# Patient Record
Sex: MALE | Race: WHITE | Employment: OTHER | URBAN - METROPOLITAN AREA
[De-identification: names, ages, dates, MRNs, and addresses within clinical notes are randomized per-mention and may not be internally consistent; named-entity substitution may affect disease eponyms.]

---

## 2018-05-03 ENCOUNTER — HOSPITAL ENCOUNTER (OUTPATIENT)
Age: 68
Discharge: HOME | End: 2018-05-05
Payer: MEDICARE

## 2018-05-03 ENCOUNTER — HOSPITAL ENCOUNTER (OUTPATIENT)
Dept: GENERAL RADIOLOGY | Age: 68
Discharge: HOME | End: 2018-05-05
Payer: MEDICARE

## 2018-05-03 DIAGNOSIS — M54.5 LOW BACK PAIN, UNSPECIFIED BACK PAIN LATERALITY, UNSPECIFIED CHRONICITY, WITH SCIATICA PRESENCE UNSPECIFIED: ICD-10-CM

## 2018-05-03 PROCEDURE — 72100 X-RAY EXAM L-S SPINE 2/3 VWS: CPT

## 2018-05-25 ENCOUNTER — HOSPITAL ENCOUNTER (OUTPATIENT)
Age: 68
Discharge: HOME | End: 2018-05-25
Payer: MEDICARE

## 2018-05-25 LAB
BASOPHILS ABSOLUTE: 0.06 E9/L (ref 0–0.2)
BASOPHILS RELATIVE PERCENT: 0.8 % (ref 0–2)
BUN BLDV-MCNC: 17 MG/DL (ref 8–23)
CHOLESTEROL, TOTAL: 237 MG/DL (ref 0–199)
CREAT SERPL-MCNC: 1.1 MG/DL (ref 0.7–1.2)
EOSINOPHILS ABSOLUTE: 0.3 E9/L (ref 0.05–0.5)
EOSINOPHILS RELATIVE PERCENT: 4 % (ref 0–6)
GFR AFRICAN AMERICAN: >60
GFR NON-AFRICAN AMERICAN: >60 ML/MIN/1.73
HCT VFR BLD CALC: 39.1 % (ref 37–54)
HDLC SERPL-MCNC: 53 MG/DL
HEMOGLOBIN: 13.1 G/DL (ref 12.5–16.5)
IMMATURE GRANULOCYTES #: 0.05 E9/L
IMMATURE GRANULOCYTES %: 0.7 % (ref 0–5)
LDL CHOLESTEROL CALCULATED: 165 MG/DL (ref 0–99)
LYMPHOCYTES ABSOLUTE: 2.27 E9/L (ref 1.5–4)
LYMPHOCYTES RELATIVE PERCENT: 30 % (ref 20–42)
MCH RBC QN AUTO: 29.1 PG (ref 26–35)
MCHC RBC AUTO-ENTMCNC: 33.5 % (ref 32–34.5)
MCV RBC AUTO: 86.9 FL (ref 80–99.9)
MONOCYTES ABSOLUTE: 0.6 E9/L (ref 0.1–0.95)
MONOCYTES RELATIVE PERCENT: 7.9 % (ref 2–12)
NEUTROPHILS ABSOLUTE: 4.29 E9/L (ref 1.8–7.3)
NEUTROPHILS RELATIVE PERCENT: 56.6 % (ref 43–80)
PDW BLD-RTO: 13.2 FL (ref 11.5–15)
PLATELET # BLD: 211 E9/L (ref 130–450)
PMV BLD AUTO: 9.6 FL (ref 7–12)
POTASSIUM SERPL-SCNC: 4.3 MMOL/L (ref 3.5–5)
RBC # BLD: 4.5 E12/L (ref 3.8–5.8)
TRIGL SERPL-MCNC: 96 MG/DL (ref 0–149)
VLDLC SERPL CALC-MCNC: 19 MG/DL
WBC # BLD: 7.6 E9/L (ref 4.5–11.5)

## 2018-05-25 PROCEDURE — 36415 COLL VENOUS BLD VENIPUNCTURE: CPT

## 2018-05-25 PROCEDURE — 84132 ASSAY OF SERUM POTASSIUM: CPT

## 2018-05-25 PROCEDURE — 85025 COMPLETE CBC W/AUTO DIFF WBC: CPT

## 2018-05-25 PROCEDURE — 84520 ASSAY OF UREA NITROGEN: CPT

## 2018-05-25 PROCEDURE — 82565 ASSAY OF CREATININE: CPT

## 2018-05-25 PROCEDURE — 80061 LIPID PANEL: CPT

## 2018-07-14 ENCOUNTER — HOSPITAL ENCOUNTER (OUTPATIENT)
Dept: MRI IMAGING | Age: 68
Discharge: HOME | End: 2018-07-16
Payer: MEDICARE

## 2018-07-14 DIAGNOSIS — M54.17 LUMBOSACRAL NEURITIS: ICD-10-CM

## 2018-07-14 PROCEDURE — 72148 MRI LUMBAR SPINE W/O DYE: CPT

## 2018-09-01 ENCOUNTER — HOSPITAL ENCOUNTER (OUTPATIENT)
Age: 68
Discharge: HOME | End: 2018-09-01
Payer: MEDICARE

## 2018-09-01 LAB
BUN BLDV-MCNC: 18 MG/DL (ref 8–23)
CREAT SERPL-MCNC: 1.2 MG/DL (ref 0.7–1.2)
GFR AFRICAN AMERICAN: >60
GFR NON-AFRICAN AMERICAN: >60 ML/MIN/1.73

## 2018-09-01 PROCEDURE — 82565 ASSAY OF CREATININE: CPT

## 2018-09-01 PROCEDURE — 36415 COLL VENOUS BLD VENIPUNCTURE: CPT

## 2018-09-01 PROCEDURE — 84520 ASSAY OF UREA NITROGEN: CPT

## 2018-12-12 ENCOUNTER — HOSPITAL ENCOUNTER (OUTPATIENT)
Dept: GENERAL RADIOLOGY | Age: 68
Discharge: HOME | End: 2018-12-14
Payer: MEDICARE

## 2018-12-12 ENCOUNTER — HOSPITAL ENCOUNTER (OUTPATIENT)
Age: 68
Discharge: HOME | End: 2018-12-14
Payer: MEDICARE

## 2018-12-12 DIAGNOSIS — M15.0 PRIMARY GENERALIZED (OSTEO)ARTHRITIS: ICD-10-CM

## 2018-12-12 PROCEDURE — 73502 X-RAY EXAM HIP UNI 2-3 VIEWS: CPT

## 2018-12-12 PROCEDURE — 73630 X-RAY EXAM OF FOOT: CPT

## 2019-03-01 ENCOUNTER — HOSPITAL ENCOUNTER (OUTPATIENT)
Dept: GENERAL RADIOLOGY | Age: 69
Discharge: HOME | End: 2019-03-03
Payer: MEDICARE

## 2019-03-01 ENCOUNTER — HOSPITAL ENCOUNTER (OUTPATIENT)
Age: 69
Discharge: HOME | End: 2019-03-03
Payer: MEDICARE

## 2019-03-01 DIAGNOSIS — R52 PAIN: ICD-10-CM

## 2019-03-01 PROCEDURE — 72100 X-RAY EXAM L-S SPINE 2/3 VWS: CPT

## 2019-03-01 PROCEDURE — 72072 X-RAY EXAM THORAC SPINE 3VWS: CPT

## 2020-05-12 ENCOUNTER — HOSPITAL ENCOUNTER (OUTPATIENT)
Age: 70
Discharge: HOME | End: 2020-05-12
Payer: MEDICARE

## 2020-05-12 LAB
ALBUMIN SERPL-MCNC: 4.1 G/DL (ref 3.5–5.2)
ALP BLD-CCNC: 77 U/L (ref 40–129)
ALT SERPL-CCNC: 19 U/L (ref 0–40)
AST SERPL-CCNC: 21 U/L (ref 0–39)
BASOPHILS ABSOLUTE: 0.06 E9/L (ref 0–0.2)
BASOPHILS RELATIVE PERCENT: 0.7 % (ref 0–2)
BILIRUB SERPL-MCNC: 0.7 MG/DL (ref 0–1.2)
BILIRUBIN DIRECT: <0.2 MG/DL (ref 0–0.3)
BILIRUBIN, INDIRECT: NORMAL MG/DL (ref 0–1)
BUN BLDV-MCNC: 16 MG/DL (ref 8–23)
CHOLESTEROL, TOTAL: 147 MG/DL (ref 0–199)
CREAT SERPL-MCNC: 1.2 MG/DL (ref 0.7–1.2)
EOSINOPHILS ABSOLUTE: 0.37 E9/L (ref 0.05–0.5)
EOSINOPHILS RELATIVE PERCENT: 4.4 % (ref 0–6)
GFR AFRICAN AMERICAN: >60
GFR NON-AFRICAN AMERICAN: 60 ML/MIN/1.73
HCT VFR BLD CALC: 39.8 % (ref 37–54)
HDLC SERPL-MCNC: 44 MG/DL
HEMOGLOBIN: 13.2 G/DL (ref 12.5–16.5)
IMMATURE GRANULOCYTES #: 0.04 E9/L
IMMATURE GRANULOCYTES %: 0.5 % (ref 0–5)
LDL CHOLESTEROL CALCULATED: 81 MG/DL (ref 0–99)
LYMPHOCYTES ABSOLUTE: 2.63 E9/L (ref 1.5–4)
LYMPHOCYTES RELATIVE PERCENT: 31.5 % (ref 20–42)
MCH RBC QN AUTO: 29.7 PG (ref 26–35)
MCHC RBC AUTO-ENTMCNC: 33.2 % (ref 32–34.5)
MCV RBC AUTO: 89.4 FL (ref 80–99.9)
MONOCYTES ABSOLUTE: 0.74 E9/L (ref 0.1–0.95)
MONOCYTES RELATIVE PERCENT: 8.9 % (ref 2–12)
NEUTROPHILS ABSOLUTE: 4.52 E9/L (ref 1.8–7.3)
NEUTROPHILS RELATIVE PERCENT: 54 % (ref 43–80)
PDW BLD-RTO: 12.6 FL (ref 11.5–15)
PLATELET # BLD: 238 E9/L (ref 130–450)
PMV BLD AUTO: 9.7 FL (ref 7–12)
POTASSIUM SERPL-SCNC: 4.9 MMOL/L (ref 3.5–5)
RBC # BLD: 4.45 E12/L (ref 3.8–5.8)
TOTAL PROTEIN: 7.2 G/DL (ref 6.4–8.3)
TRIGL SERPL-MCNC: 110 MG/DL (ref 0–149)
VLDLC SERPL CALC-MCNC: 22 MG/DL
WBC # BLD: 8.4 E9/L (ref 4.5–11.5)

## 2020-05-12 PROCEDURE — 36415 COLL VENOUS BLD VENIPUNCTURE: CPT

## 2020-05-12 PROCEDURE — 82565 ASSAY OF CREATININE: CPT

## 2020-05-12 PROCEDURE — 84520 ASSAY OF UREA NITROGEN: CPT

## 2020-05-12 PROCEDURE — 80076 HEPATIC FUNCTION PANEL: CPT

## 2020-05-12 PROCEDURE — 85025 COMPLETE CBC W/AUTO DIFF WBC: CPT

## 2020-05-12 PROCEDURE — 84132 ASSAY OF SERUM POTASSIUM: CPT

## 2020-05-12 PROCEDURE — 80061 LIPID PANEL: CPT

## 2020-06-15 ENCOUNTER — HOSPITAL ENCOUNTER (OUTPATIENT)
Age: 70
Discharge: HOME | End: 2020-06-15
Payer: MEDICARE

## 2020-06-15 LAB
BASOPHILS ABSOLUTE: 0.09 E9/L (ref 0–0.2)
BASOPHILS RELATIVE PERCENT: 1 % (ref 0–2)
BUN BLDV-MCNC: 15 MG/DL (ref 8–23)
CREAT SERPL-MCNC: 1.2 MG/DL (ref 0.7–1.2)
EOSINOPHILS ABSOLUTE: 0.45 E9/L (ref 0.05–0.5)
EOSINOPHILS RELATIVE PERCENT: 4.9 % (ref 0–6)
GFR AFRICAN AMERICAN: >60
GFR NON-AFRICAN AMERICAN: 60 ML/MIN/1.73
HCT VFR BLD CALC: 42.4 % (ref 37–54)
HEMOGLOBIN: 13.4 G/DL (ref 12.5–16.5)
IMMATURE GRANULOCYTES #: 0.07 E9/L
IMMATURE GRANULOCYTES %: 0.8 % (ref 0–5)
LYMPHOCYTES ABSOLUTE: 2.38 E9/L (ref 1.5–4)
LYMPHOCYTES RELATIVE PERCENT: 26 % (ref 20–42)
MCH RBC QN AUTO: 29.1 PG (ref 26–35)
MCHC RBC AUTO-ENTMCNC: 31.6 % (ref 32–34.5)
MCV RBC AUTO: 92.2 FL (ref 80–99.9)
MONOCYTES ABSOLUTE: 0.94 E9/L (ref 0.1–0.95)
MONOCYTES RELATIVE PERCENT: 10.3 % (ref 2–12)
NEUTROPHILS ABSOLUTE: 5.21 E9/L (ref 1.8–7.3)
NEUTROPHILS RELATIVE PERCENT: 57 % (ref 43–80)
PDW BLD-RTO: 12.7 FL (ref 11.5–15)
PLATELET # BLD: 227 E9/L (ref 130–450)
PMV BLD AUTO: 9.7 FL (ref 7–12)
POTASSIUM SERPL-SCNC: 5 MMOL/L (ref 3.5–5)
RBC # BLD: 4.6 E12/L (ref 3.8–5.8)
SEDIMENTATION RATE, ERYTHROCYTE: 5 MM/HR (ref 0–15)
WBC # BLD: 9.1 E9/L (ref 4.5–11.5)

## 2020-06-15 PROCEDURE — 82565 ASSAY OF CREATININE: CPT

## 2020-06-15 PROCEDURE — 85025 COMPLETE CBC W/AUTO DIFF WBC: CPT

## 2020-06-15 PROCEDURE — 84520 ASSAY OF UREA NITROGEN: CPT

## 2020-06-15 PROCEDURE — 85651 RBC SED RATE NONAUTOMATED: CPT

## 2020-06-15 PROCEDURE — 36415 COLL VENOUS BLD VENIPUNCTURE: CPT

## 2020-06-15 PROCEDURE — 84132 ASSAY OF SERUM POTASSIUM: CPT

## 2020-09-14 ENCOUNTER — HOSPITAL ENCOUNTER (OUTPATIENT)
Dept: NUCLEAR MEDICINE | Age: 70
Discharge: HOME | End: 2020-09-14
Payer: MEDICARE

## 2020-09-14 PROCEDURE — 3430000000 HC RX DIAGNOSTIC RADIOPHARMACEUTICAL: Performed by: RADIOLOGY

## 2020-09-14 PROCEDURE — A9552 F18 FDG: HCPCS | Performed by: RADIOLOGY

## 2020-09-14 PROCEDURE — 78815 PET IMAGE W/CT SKULL-THIGH: CPT

## 2020-09-14 RX ORDER — FLUDEOXYGLUCOSE F 18 200 MCI/ML
15 INJECTION, SOLUTION INTRAVENOUS
Status: COMPLETED | OUTPATIENT
Start: 2020-09-14 | End: 2020-09-14

## 2020-09-14 RX ADMIN — FLUDEOXYGLUCOSE F 18 15 MILLICURIE: 200 INJECTION, SOLUTION INTRAVENOUS at 14:31

## 2021-02-08 ENCOUNTER — HOSPITAL ENCOUNTER (OUTPATIENT)
Age: 71
Discharge: HOME | End: 2021-02-10
Payer: MEDICARE

## 2021-02-08 PROCEDURE — U0003 INFECTIOUS AGENT DETECTION BY NUCLEIC ACID (DNA OR RNA); SEVERE ACUTE RESPIRATORY SYNDROME CORONAVIRUS 2 (SARS-COV-2) (CORONAVIRUS DISEASE [COVID-19]), AMPLIFIED PROBE TECHNIQUE, MAKING USE OF HIGH THROUGHPUT TECHNOLOGIES AS DESCRIBED BY CMS-2020-01-R: HCPCS

## 2021-02-09 LAB — SARS-COV-2, PCR: NOT DETECTED

## 2021-05-15 ENCOUNTER — HOSPITAL ENCOUNTER (OUTPATIENT)
Dept: CT IMAGING | Age: 71
Discharge: HOME | End: 2021-05-15
Payer: MEDICARE

## 2021-05-15 DIAGNOSIS — R51.9 ACUTE NONINTRACTABLE HEADACHE, UNSPECIFIED HEADACHE TYPE: ICD-10-CM

## 2021-05-15 DIAGNOSIS — R42 VERTIGO: ICD-10-CM

## 2021-05-27 ENCOUNTER — HOSPITAL ENCOUNTER (OUTPATIENT)
Dept: CT IMAGING | Age: 71
Discharge: HOME | End: 2021-05-27
Payer: MEDICARE

## 2021-05-27 PROCEDURE — 70470 CT HEAD/BRAIN W/O & W/DYE: CPT

## 2021-05-27 PROCEDURE — 6360000004 HC RX CONTRAST MEDICATION: Performed by: RADIOLOGY

## 2021-05-27 RX ADMIN — IOPAMIDOL 75 ML: 755 INJECTION, SOLUTION INTRAVENOUS at 08:35

## 2022-02-09 ENCOUNTER — HOSPITAL ENCOUNTER (OUTPATIENT)
Age: 72
Discharge: HOME | End: 2022-02-11
Payer: MEDICARE

## 2022-02-09 ENCOUNTER — HOSPITAL ENCOUNTER (OUTPATIENT)
Dept: GENERAL RADIOLOGY | Age: 72
Discharge: HOME | End: 2022-02-11
Payer: MEDICARE

## 2022-02-09 DIAGNOSIS — R06.00 DYSPNEA AND RESPIRATORY ABNORMALITY: ICD-10-CM

## 2022-02-09 DIAGNOSIS — R06.89 DYSPNEA AND RESPIRATORY ABNORMALITY: ICD-10-CM

## 2022-02-09 PROCEDURE — 71046 X-RAY EXAM CHEST 2 VIEWS: CPT

## 2022-02-10 ENCOUNTER — HOSPITAL ENCOUNTER (OUTPATIENT)
Dept: NON INVASIVE DIAGNOSTICS | Age: 72
Discharge: HOME | End: 2022-02-10
Payer: MEDICARE

## 2022-02-10 ENCOUNTER — HOSPITAL ENCOUNTER (OUTPATIENT)
Dept: NUCLEAR MEDICINE | Age: 72
Discharge: HOME | End: 2022-02-10
Payer: MEDICARE

## 2022-02-10 ENCOUNTER — HOSPITAL ENCOUNTER (OUTPATIENT)
Age: 72
Discharge: HOME | End: 2022-02-10
Payer: MEDICARE

## 2022-02-10 DIAGNOSIS — R07.9 CHEST PAIN, UNSPECIFIED TYPE: ICD-10-CM

## 2022-02-10 LAB
EKG ATRIAL RATE: 81 BPM
EKG P AXIS: 73 DEGREES
EKG P-R INTERVAL: 176 MS
EKG Q-T INTERVAL: 436 MS
EKG QRS DURATION: 114 MS
EKG QTC CALCULATION (BAZETT): 506 MS
EKG R AXIS: -20 DEGREES
EKG T AXIS: 46 DEGREES
EKG VENTRICULAR RATE: 81 BPM
LV EF: 24 %
LV EF: 25 %
LVEF MODALITY: NORMAL
LVEF MODALITY: NORMAL

## 2022-02-10 PROCEDURE — 78452 HT MUSCLE IMAGE SPECT MULT: CPT | Performed by: INTERNAL MEDICINE

## 2022-02-10 PROCEDURE — 3430000000 HC RX DIAGNOSTIC RADIOPHARMACEUTICAL: Performed by: RADIOLOGY

## 2022-02-10 PROCEDURE — 6360000002 HC RX W HCPCS: Performed by: FAMILY MEDICINE

## 2022-02-10 PROCEDURE — 93016 CV STRESS TEST SUPVJ ONLY: CPT | Performed by: INTERNAL MEDICINE

## 2022-02-10 PROCEDURE — 93306 TTE W/DOPPLER COMPLETE: CPT

## 2022-02-10 PROCEDURE — 78452 HT MUSCLE IMAGE SPECT MULT: CPT

## 2022-02-10 PROCEDURE — 93017 CV STRESS TEST TRACING ONLY: CPT

## 2022-02-10 PROCEDURE — 3430000000 HC RX DIAGNOSTIC RADIOPHARMACEUTICAL: Performed by: FAMILY MEDICINE

## 2022-02-10 PROCEDURE — 93018 CV STRESS TEST I&R ONLY: CPT | Performed by: INTERNAL MEDICINE

## 2022-02-10 PROCEDURE — 93005 ELECTROCARDIOGRAM TRACING: CPT | Performed by: FAMILY MEDICINE

## 2022-02-10 PROCEDURE — A9500 TC99M SESTAMIBI: HCPCS | Performed by: FAMILY MEDICINE

## 2022-02-10 PROCEDURE — A9500 TC99M SESTAMIBI: HCPCS | Performed by: RADIOLOGY

## 2022-02-10 RX ADMIN — Medication 30 MILLICURIE: at 08:56

## 2022-02-10 RX ADMIN — REGADENOSON 0.4 MG: 0.08 INJECTION, SOLUTION INTRAVENOUS at 08:52

## 2022-02-10 RX ADMIN — Medication 10 MILLICURIE: at 07:25

## 2022-02-10 NOTE — PROCEDURES
Lexiscan Stress EKG Report:    Dx CP    Baseline EKG: normal sinus rhythm, nonspecific ST and T waves changes. Stress EKG: No ST-T changes. Arrhythmias: None. Symptoms: None. Summary:  Unremarkable lexiscan stress EKG. See separate report for stress perfusion results. Mark Samayoa D.O.   Cardiologist  Cardiology, [facility]

## 2022-08-09 ENCOUNTER — HOSPITAL ENCOUNTER (OUTPATIENT)
Dept: GENERAL RADIOLOGY | Age: 72
Discharge: HOME | End: 2022-08-11
Payer: MEDICARE

## 2022-08-09 ENCOUNTER — HOSPITAL ENCOUNTER (OUTPATIENT)
Age: 72
Discharge: HOME | End: 2022-08-11
Payer: MEDICARE

## 2022-08-09 DIAGNOSIS — R06.00 DYSPNEA, UNSPECIFIED TYPE: ICD-10-CM

## 2022-08-09 PROCEDURE — 71046 X-RAY EXAM CHEST 2 VIEWS: CPT

## 2023-02-24 LAB
ANION GAP IN SER/PLAS: 15 MMOL/L (ref 10–20)
CALCIUM (MG/DL) IN SER/PLAS: 9.7 MG/DL (ref 8.6–10.3)
CARBON DIOXIDE, TOTAL (MMOL/L) IN SER/PLAS: 28 MMOL/L (ref 21–32)
CHLORIDE (MMOL/L) IN SER/PLAS: 107 MMOL/L (ref 98–107)
CREATININE (MG/DL) IN SER/PLAS: 1.07 MG/DL (ref 0.5–1.3)
GFR MALE: 73 ML/MIN/1.73M2
GLUCOSE (MG/DL) IN SER/PLAS: 104 MG/DL (ref 74–99)
POTASSIUM (MMOL/L) IN SER/PLAS: 4.9 MMOL/L (ref 3.5–5.3)
SODIUM (MMOL/L) IN SER/PLAS: 145 MMOL/L (ref 136–145)
UREA NITROGEN (MG/DL) IN SER/PLAS: 15 MG/DL (ref 6–23)

## 2023-03-31 LAB
ANION GAP IN SER/PLAS: 11 MMOL/L (ref 10–20)
CALCIUM (MG/DL) IN SER/PLAS: 9.3 MG/DL (ref 8.6–10.3)
CARBON DIOXIDE, TOTAL (MMOL/L) IN SER/PLAS: 27 MMOL/L (ref 21–32)
CHLORIDE (MMOL/L) IN SER/PLAS: 105 MMOL/L (ref 98–107)
CREATININE (MG/DL) IN SER/PLAS: 1.06 MG/DL (ref 0.5–1.3)
GFR MALE: 74 ML/MIN/1.73M2
GLUCOSE (MG/DL) IN SER/PLAS: 101 MG/DL (ref 74–99)
POTASSIUM (MMOL/L) IN SER/PLAS: 4 MMOL/L (ref 3.5–5.3)
SODIUM (MMOL/L) IN SER/PLAS: 139 MMOL/L (ref 136–145)
UREA NITROGEN (MG/DL) IN SER/PLAS: 17 MG/DL (ref 6–23)

## 2023-05-09 LAB
ANION GAP IN SER/PLAS: 11 MMOL/L (ref 10–20)
CALCIUM (MG/DL) IN SER/PLAS: 9.6 MG/DL (ref 8.6–10.3)
CARBON DIOXIDE, TOTAL (MMOL/L) IN SER/PLAS: 30 MMOL/L (ref 21–32)
CHLORIDE (MMOL/L) IN SER/PLAS: 102 MMOL/L (ref 98–107)
CREATININE (MG/DL) IN SER/PLAS: 1.07 MG/DL (ref 0.5–1.3)
GFR MALE: 73 ML/MIN/1.73M2
GLUCOSE (MG/DL) IN SER/PLAS: 109 MG/DL (ref 74–99)
POTASSIUM (MMOL/L) IN SER/PLAS: 4 MMOL/L (ref 3.5–5.3)
SODIUM (MMOL/L) IN SER/PLAS: 139 MMOL/L (ref 136–145)
UREA NITROGEN (MG/DL) IN SER/PLAS: 18 MG/DL (ref 6–23)

## 2023-07-24 RX ORDER — CARVEDILOL 12.5 MG/1
12.5 TABLET ORAL 2 TIMES DAILY WITH MEALS
COMMUNITY

## 2023-07-24 RX ORDER — OMEPRAZOLE 20 MG/1
20 CAPSULE, DELAYED RELEASE ORAL DAILY
COMMUNITY

## 2023-07-24 RX ORDER — HYDROCHLOROTHIAZIDE 25 MG/1
25 TABLET ORAL DAILY
COMMUNITY

## 2023-07-24 RX ORDER — EPLERENONE 50 MG/1
50 TABLET, FILM COATED ORAL DAILY
COMMUNITY

## 2023-07-24 RX ORDER — HYDRALAZINE HYDROCHLORIDE 50 MG/1
50 TABLET, FILM COATED ORAL 2 TIMES DAILY
COMMUNITY

## 2023-07-24 NOTE — PROGRESS NOTES
[address] [facility] PRE-ADMISSION TESTING INSTRUCTIONS    The Preadmission Testing patient is instructed accordingly using the following criteria (check applicable):    ARRIVAL INSTRUCTIONS:  [x] Parking the day of Surgery is located in the Main Entrance lot. Upon entering the door, make an immediate right to the surgery reception desk    [x] Bring photo ID and insurance card    [] Bring in a copy of Living will or Durable Power of  papers. [x] Please be sure to arrange for responsible adult to provide transportation to and from the hospital     Please arrange for responsible adult to be w[x]ith you for the 24 hour period post procedure due to having anesthesia    [x] If you awake am of surgery not feeling well or have temperature >100 please call [phone]    GENERAL INSTRUCTIONS:    [x] Nothing by mouth after midnight, including gum, candy, mints or water    [x] You may brush your teeth, but do not swallow any water    [x] Take medications as instructed with 1-2 oz of water    [x] Stop herbal supplements and vitamins 5 days prior to procedure    [] Follow preop dosing of blood thinners per physician instructions    [] Take 1/2 dose of evening insulin, but no insulin after midnight    [] No oral diabetic medications after midnight    [] If diabetic and have low blood sugar or feel symptomatic, take 1-2oz apple juice only    [] Bring inhalers day of surgery    [] Bring C-PAP/ Bi-Pap day of surgery    [] Bring urine specimen day of surgery    [x] Shower or bath with soap, lather and rinse well, AM of Surgery, no lotion, powders or creams to surgical site    [] Follow bowel prep as instructed per surgeon    [x] No tobacco products within 24 hours of surgery     [x] No alcohol or illegal drug use within 24 hours of surgery.     [x] Jewelry, body piercing's, eyeglasses, contact lenses and dentures are not permitted into surgery (bring cases)      [] Please do not wear any nail polish,

## 2023-07-25 ENCOUNTER — ANESTHESIA EVENT (OUTPATIENT)
Dept: OPERATING ROOM | Age: 73
End: 2023-07-25
Payer: MEDICARE

## 2023-07-26 ENCOUNTER — HOSPITAL ENCOUNTER (OUTPATIENT)
Age: 73
Setting detail: OUTPATIENT SURGERY
Discharge: HOME | End: 2023-07-26
Attending: OPHTHALMOLOGY | Admitting: OPHTHALMOLOGY
Payer: MEDICARE

## 2023-07-26 ENCOUNTER — ANESTHESIA (OUTPATIENT)
Dept: OPERATING ROOM | Age: 73
End: 2023-07-26
Payer: MEDICARE

## 2023-07-26 VITALS
OXYGEN SATURATION: 94 % | SYSTOLIC BLOOD PRESSURE: 190 MMHG | WEIGHT: 229 LBS | RESPIRATION RATE: 16 BRPM | TEMPERATURE: 97.7 F | HEART RATE: 62 BPM | HEIGHT: 69 IN | DIASTOLIC BLOOD PRESSURE: 84 MMHG | BODY MASS INDEX: 33.92 KG/M2

## 2023-07-26 LAB — METER GLUCOSE: 94 MG/DL (ref 74–99)

## 2023-07-26 PROCEDURE — 2709999900 HC NON-CHARGEABLE SUPPLY: Performed by: OPHTHALMOLOGY

## 2023-07-26 PROCEDURE — 2580000003 HC RX 258: Performed by: OPHTHALMOLOGY

## 2023-07-26 PROCEDURE — V2632 POST CHMBR INTRAOCULAR LENS: HCPCS | Performed by: OPHTHALMOLOGY

## 2023-07-26 PROCEDURE — 7100000010 HC PHASE II RECOVERY - FIRST 15 MIN: Performed by: OPHTHALMOLOGY

## 2023-07-26 PROCEDURE — 6370000000 HC RX 637 (ALT 250 FOR IP)

## 2023-07-26 PROCEDURE — 7100000011 HC PHASE II RECOVERY - ADDTL 15 MIN: Performed by: OPHTHALMOLOGY

## 2023-07-26 PROCEDURE — 6370000000 HC RX 637 (ALT 250 FOR IP): Performed by: OPHTHALMOLOGY

## 2023-07-26 PROCEDURE — 2500000003 HC RX 250 WO HCPCS: Performed by: OPHTHALMOLOGY

## 2023-07-26 PROCEDURE — 6360000002 HC RX W HCPCS: Performed by: NURSE ANESTHETIST, CERTIFIED REGISTERED

## 2023-07-26 PROCEDURE — 6360000002 HC RX W HCPCS: Performed by: OPHTHALMOLOGY

## 2023-07-26 PROCEDURE — 3600000002 HC SURGERY LEVEL 2 BASE: Performed by: OPHTHALMOLOGY

## 2023-07-26 PROCEDURE — 3700000000 HC ANESTHESIA ATTENDED CARE: Performed by: OPHTHALMOLOGY

## 2023-07-26 PROCEDURE — 2500000003 HC RX 250 WO HCPCS: Performed by: NURSE ANESTHETIST, CERTIFIED REGISTERED

## 2023-07-26 PROCEDURE — 82947 ASSAY GLUCOSE BLOOD QUANT: CPT

## 2023-07-26 PROCEDURE — 3600000012 HC SURGERY LEVEL 2 ADDTL 15MIN: Performed by: OPHTHALMOLOGY

## 2023-07-26 PROCEDURE — 3700000001 HC ADD 15 MINUTES (ANESTHESIA): Performed by: OPHTHALMOLOGY

## 2023-07-26 DEVICE — ACRYSOF(R) IQ ASPHERIC NATURAL IOL, SINGLE-PIECE ACRYLIC FOLDABLE PCL, UV WITH BLUE LIGHTFILTER, 13.0MM LENGTH, 6.0MM ANTERIORASYMMETRIC BICONVEX OPTIC, PLANAR HAPTICS.
Type: IMPLANTABLE DEVICE | Site: EYE | Status: FUNCTIONAL
Brand: ACRYSOF®

## 2023-07-26 RX ORDER — BALANCED SALT SOLUTION ENRICHED WITH BICARBONATE, DEXTROSE, AND GLUTATHIONE
KIT INTRAOCULAR PRN
Status: DISCONTINUED | OUTPATIENT
Start: 2023-07-26 | End: 2023-07-26 | Stop reason: ALTCHOICE

## 2023-07-26 RX ORDER — TROPICAMIDE 10 MG/ML
1 SOLUTION/ DROPS OPHTHALMIC SEE ADMIN INSTRUCTIONS
Status: COMPLETED | OUTPATIENT
Start: 2023-07-26 | End: 2023-07-26

## 2023-07-26 RX ORDER — PHENYLEPHRINE HCL 2.5 %
1 DROPS OPHTHALMIC (EYE) SEE ADMIN INSTRUCTIONS
Status: COMPLETED | OUTPATIENT
Start: 2023-07-26 | End: 2023-07-26

## 2023-07-26 RX ORDER — KETOROLAC TROMETHAMINE 5 MG/ML
SOLUTION OPHTHALMIC PRN
Status: DISCONTINUED | OUTPATIENT
Start: 2023-07-26 | End: 2023-07-26 | Stop reason: ALTCHOICE

## 2023-07-26 RX ORDER — CIPROFLOXACIN HYDROCHLORIDE 3.5 MG/ML
SOLUTION/ DROPS TOPICAL PRN
Status: DISCONTINUED | OUTPATIENT
Start: 2023-07-26 | End: 2023-07-26 | Stop reason: ALTCHOICE

## 2023-07-26 RX ORDER — TOBRAMYCIN 3 MG/ML
1 SOLUTION/ DROPS OPHTHALMIC
Status: DISCONTINUED | OUTPATIENT
Start: 2023-07-26 | End: 2023-07-26

## 2023-07-26 RX ORDER — TETRACAINE HYDROCHLORIDE 5 MG/ML
1 SOLUTION OPHTHALMIC SEE ADMIN INSTRUCTIONS
Status: COMPLETED | OUTPATIENT
Start: 2023-07-26 | End: 2023-07-26

## 2023-07-26 RX ORDER — PROPOFOL 10 MG/ML
INJECTION, EMULSION INTRAVENOUS PRN
Status: DISCONTINUED | OUTPATIENT
Start: 2023-07-26 | End: 2023-07-26 | Stop reason: SDUPTHER

## 2023-07-26 RX ORDER — GLYCOPYRROLATE 0.2 MG/ML
INJECTION INTRAMUSCULAR; INTRAVENOUS PRN
Status: DISCONTINUED | OUTPATIENT
Start: 2023-07-26 | End: 2023-07-26 | Stop reason: SDUPTHER

## 2023-07-26 RX ORDER — PREDNISOLONE ACETATE 10 MG/ML
SUSPENSION/ DROPS OPHTHALMIC PRN
Status: DISCONTINUED | OUTPATIENT
Start: 2023-07-26 | End: 2023-07-26 | Stop reason: ALTCHOICE

## 2023-07-26 RX ORDER — SODIUM CHLORIDE 9 MG/ML
INJECTION, SOLUTION INTRAVENOUS CONTINUOUS
Status: DISCONTINUED | OUTPATIENT
Start: 2023-07-26 | End: 2023-07-26 | Stop reason: HOSPADM

## 2023-07-26 RX ADMIN — PHENYLEPHRINE HYDROCHLORIDE 1 DROP: 25 SOLUTION/ DROPS OPHTHALMIC at 13:36

## 2023-07-26 RX ADMIN — GLYCOPYRROLATE 0.3 MG: 0.2 INJECTION INTRAMUSCULAR; INTRAVENOUS at 14:32

## 2023-07-26 RX ADMIN — PHENYLEPHRINE HYDROCHLORIDE 1 DROP: 25 SOLUTION/ DROPS OPHTHALMIC at 13:46

## 2023-07-26 RX ADMIN — Medication 1 DROP: at 13:37

## 2023-07-26 RX ADMIN — TETRACAINE HYDROCHLORIDE 1 DROP: 5 SOLUTION OPHTHALMIC at 14:00

## 2023-07-26 RX ADMIN — PROPOFOL 100 MG: 10 INJECTION, EMULSION INTRAVENOUS at 14:24

## 2023-07-26 RX ADMIN — Medication 1 DROP: at 14:00

## 2023-07-26 RX ADMIN — TETRACAINE HYDROCHLORIDE 1 DROP: 5 SOLUTION OPHTHALMIC at 13:36

## 2023-07-26 RX ADMIN — PHENYLEPHRINE HYDROCHLORIDE 1 DROP: 25 SOLUTION/ DROPS OPHTHALMIC at 14:00

## 2023-07-26 RX ADMIN — Medication 1 DROP: at 13:46

## 2023-07-26 RX ADMIN — TETRACAINE HYDROCHLORIDE 1 DROP: 5 SOLUTION OPHTHALMIC at 13:46

## 2023-07-26 ASSESSMENT — PAIN SCALES - GENERAL
PAINLEVEL_OUTOF10: 0
PAINLEVEL_OUTOF10: 0

## 2023-07-26 NOTE — DISCHARGE INSTR - MEDS
Keep patch in place tonight. OK to remove in AM. Do not throw away plastic shield, tape this over right eye without any gauze for one week after the surgery. Use OMNI \"All in One\" drops, one drop 3X daily for one week, then 2X daily for an additional 3 weeks. Place Tobradex ointment in right eye at bedtime for 3 weeks or until gone. Bring all drops to appointment in [place] office tomorrow.     Visit is scheduled at 07:45 tomorrow AM in  [place] with Dr. Brea Tubbs

## 2023-07-26 NOTE — BRIEF OP NOTE
Brief Postoperative Note      Patient: Jaxson Records  YOB: 1950  MRN: [id number]    Date of Procedure: 7/26/2023    Pre-Op Diagnosis Codes:     * Nuclear sclerotic cataract of right eye [H25.11]    Post-Op Diagnosis: Same       Procedure(s):  RIGHT EYE CATARACT EXTRACTION IOL IMPLANT    Surgeon(s):  Blossom Simpson MD    Assistant:  * No surgical staff found *    Anesthesia: Monitor Anesthesia Care    Estimated Blood Loss (mL): Minimal    Complications: None    Specimens:   * No specimens in log *    Implants:  Implant Name Type Inv.  Item Serial No.  Lot No. LRB No. Used Action   LENS INTOCU 6.0 TO 30.0 DIOPT 118.7 A CONSTANT 0DEG ANG - [id number]  LENS INTOCU 6.0 TO 30.0 DIOPT 118.7 A CONSTANT 0DEG ANG [id number]-  Right 1 Implanted         Drains: * No LDAs found *    Findings: none      Electronically signed by Blossom Simpson MD on 7/26/2023 at 3:13 PM

## 2023-07-27 NOTE — ANESTHESIA POSTPROCEDURE EVALUATION
Department of Anesthesiology  Postprocedure Note    Patient: Bernadette Almeida  MRN: [id number]  [address]: 1950  Date of evaluation: 7/26/2023      Procedure Summary     Date: 07/26/23 Room / Location: [facility] OR 03 / [facility]    Anesthesia Start: 8869 Anesthesia Stop: 1504    Procedure: RIGHT EYE CATARACT EXTRACTION IOL IMPLANT (Right: Eye) Diagnosis:       Nuclear sclerotic cataract of right eye      (Nuclear sclerotic cataract of right eye [H25.11])    Surgeons: Parish Babb MD Responsible Provider: Chandrika Adam MD    Anesthesia Type: MAC ASA Status: 4          Anesthesia Type: MAC    Naomi Phase I: Naomi Score: 10    Naomi Phase II: Naomi Score: 10      Anesthesia Post Evaluation    Patient location during evaluation: PACU  Patient participation: complete - patient participated  Level of consciousness: awake and alert  Pain score: 3  Airway patency: patent  Nausea & Vomiting: no vomiting and no nausea  Complications: no  Cardiovascular status: hemodynamically stable  Respiratory status: spontaneous ventilation, nonlabored ventilation and acceptable  Hydration status: stable

## 2023-07-27 NOTE — OP NOTE
was.    Remainder of the cortical material was removed with the I/A handpiece  and the undersurface of the lens capsule was vacuumed of lens epithelial  cells and the posterior capsule was polished with a Alex scratcher. The capsular bag was inflated and an Gorge AcrySof lens, [id number], was  then injected into the capsular bag after inflating with the Provisc. The incisions were hydrated and the I/A handpiece was used to remove the  remainder of the viscoelastic from behind the lens as well as the  anterior chamber. It was removed and further hydration of the incisions  was then ensued to ensure that they were watertight. The patient received intracameral cefuroxime and lid speculum and drapes  were removed and the patient received Pred Forte, ketorolac drops as  well as TobraDex ointment. A pressure patch and Strange shield were taped  to the eye and the patient was then discharged to the recovery room from  the operating room in excellent condition having tolerated the procedure  well.         Dora Ackerman MD    D: 07/26/2023 15:45:12       T: 07/27/2023 2:44:03     [email or web address]  Job#: [id number]     Doc#: [id number]    CC:

## 2023-08-21 RX ORDER — ASPIRIN 81 MG/1
81 TABLET ORAL DAILY
COMMUNITY

## 2023-08-21 NOTE — PROGRESS NOTES
[address] [employer] PRE-ADMISSION TESTING INSTRUCTIONS    The Preadmission Testing patient is instructed accordingly using the following criteria (check applicable):    ARRIVAL INSTRUCTIONS:  [x] Parking the day of Surgery is located in the Main Entrance lot. Upon entering the door, make an immediate right to the surgery reception desk    [x] Bring photo ID and insurance card    [x] Bring in a copy of Living will or Durable Power of  papers. [x] Please be sure to arrange for responsible adult to provide transportation to and from the hospital    [x] Please arrange for responsible adult to be with you for the 24 hour period post procedure due to having anesthesia    [x] If you awake am of surgery not feeling well or have temperature >100 please call [phone]    GENERAL INSTRUCTIONS:    [x] Nothing by mouth after midnight, including gum, candy, mints or water    [x] You may brush your teeth, but do not swallow any water    [x] Take medications as instructed with 1-2 oz of water    [x] Stop herbal supplements and vitamins 5 days prior to procedure    [x] Follow preop dosing of blood thinners per physician instructions    [] Take 1/2 dose of evening insulin, but no insulin after midnight    [] No oral diabetic medications after midnight    [] If diabetic and have low blood sugar or feel symptomatic, take 1-2oz apple juice only    [] Bring inhalers day of surgery    [] Bring C-PAP/ Bi-Pap day of surgery    [] Bring urine specimen day of surgery    [x] Shower or bath with soap, lather and rinse well, AM of Surgery, no lotion, powders or creams to surgical site    [] Follow bowel prep as instructed per surgeon    [x] No tobacco products within 24 hours of surgery     [x] No alcohol or illegal drug use within 24 hours of surgery.     [x] Jewelry, body piercing's, eyeglasses, contact lenses and dentures are not permitted into surgery (bring cases)      [x] Please do not wear any nail polish,

## 2023-08-23 ENCOUNTER — HOSPITAL ENCOUNTER (OUTPATIENT)
Age: 73
Setting detail: OUTPATIENT SURGERY
Discharge: HOME | End: 2023-08-23
Attending: OPHTHALMOLOGY | Admitting: OPHTHALMOLOGY
Payer: MEDICARE

## 2023-08-23 ENCOUNTER — ANESTHESIA EVENT (OUTPATIENT)
Dept: OPERATING ROOM | Age: 73
End: 2023-08-23
Payer: MEDICARE

## 2023-08-23 ENCOUNTER — ANESTHESIA (OUTPATIENT)
Dept: OPERATING ROOM | Age: 73
End: 2023-08-23
Payer: MEDICARE

## 2023-08-23 VITALS
BODY MASS INDEX: 32.29 KG/M2 | DIASTOLIC BLOOD PRESSURE: 76 MMHG | HEIGHT: 69 IN | SYSTOLIC BLOOD PRESSURE: 145 MMHG | HEART RATE: 60 BPM | TEMPERATURE: 97.8 F | OXYGEN SATURATION: 98 % | RESPIRATION RATE: 16 BRPM | WEIGHT: 218 LBS

## 2023-08-23 PROCEDURE — 7100000010 HC PHASE II RECOVERY - FIRST 15 MIN: Performed by: OPHTHALMOLOGY

## 2023-08-23 PROCEDURE — 3600000012 HC SURGERY LEVEL 2 ADDTL 15MIN: Performed by: OPHTHALMOLOGY

## 2023-08-23 PROCEDURE — 6370000000 HC RX 637 (ALT 250 FOR IP): Performed by: OPHTHALMOLOGY

## 2023-08-23 PROCEDURE — V2632 POST CHMBR INTRAOCULAR LENS: HCPCS | Performed by: OPHTHALMOLOGY

## 2023-08-23 PROCEDURE — 3700000000 HC ANESTHESIA ATTENDED CARE: Performed by: OPHTHALMOLOGY

## 2023-08-23 PROCEDURE — 6360000002 HC RX W HCPCS: Performed by: OPHTHALMOLOGY

## 2023-08-23 PROCEDURE — 2500000003 HC RX 250 WO HCPCS

## 2023-08-23 PROCEDURE — 3700000001 HC ADD 15 MINUTES (ANESTHESIA): Performed by: OPHTHALMOLOGY

## 2023-08-23 PROCEDURE — 3600000002 HC SURGERY LEVEL 2 BASE: Performed by: OPHTHALMOLOGY

## 2023-08-23 PROCEDURE — 7100000011 HC PHASE II RECOVERY - ADDTL 15 MIN: Performed by: OPHTHALMOLOGY

## 2023-08-23 PROCEDURE — 2580000003 HC RX 258

## 2023-08-23 PROCEDURE — 2709999900 HC NON-CHARGEABLE SUPPLY: Performed by: OPHTHALMOLOGY

## 2023-08-23 PROCEDURE — 6360000002 HC RX W HCPCS

## 2023-08-23 PROCEDURE — 2500000003 HC RX 250 WO HCPCS: Performed by: OPHTHALMOLOGY

## 2023-08-23 PROCEDURE — 2580000003 HC RX 258: Performed by: OPHTHALMOLOGY

## 2023-08-23 DEVICE — ACRYSOF(R) IQ ASPHERIC NATURAL IOL, SINGLE-PIECE ACRYLIC FOLDABLE PCL, UV WITH BLUE LIGHTFILTER, 13.0MM LENGTH, 6.0MM ANTERIORASYMMETRIC BICONVEX OPTIC, PLANAR HAPTICS.
Type: IMPLANTABLE DEVICE | Site: EYE | Status: FUNCTIONAL
Brand: ACRYSOF®

## 2023-08-23 RX ORDER — ETOMIDATE 2 MG/ML
INJECTION INTRAVENOUS PRN
Status: DISCONTINUED | OUTPATIENT
Start: 2023-08-23 | End: 2023-08-23 | Stop reason: SDUPTHER

## 2023-08-23 RX ORDER — BALANCED SALT SOLUTION ENRICHED WITH BICARBONATE, DEXTROSE, AND GLUTATHIONE
KIT INTRAOCULAR PRN
Status: DISCONTINUED | OUTPATIENT
Start: 2023-08-23 | End: 2023-08-23 | Stop reason: ALTCHOICE

## 2023-08-23 RX ORDER — KETOROLAC TROMETHAMINE 5 MG/ML
SOLUTION OPHTHALMIC PRN
Status: DISCONTINUED | OUTPATIENT
Start: 2023-08-23 | End: 2023-08-23 | Stop reason: ALTCHOICE

## 2023-08-23 RX ORDER — CIPROFLOXACIN HYDROCHLORIDE 3.5 MG/ML
SOLUTION/ DROPS TOPICAL PRN
Status: DISCONTINUED | OUTPATIENT
Start: 2023-08-23 | End: 2023-08-23 | Stop reason: ALTCHOICE

## 2023-08-23 RX ORDER — PHENYLEPHRINE HYDROCHLORIDE 25 MG/ML
1 SOLUTION/ DROPS OPHTHALMIC SEE ADMIN INSTRUCTIONS
Status: COMPLETED | OUTPATIENT
Start: 2023-08-23 | End: 2023-08-23

## 2023-08-23 RX ORDER — SODIUM CHLORIDE 9 MG/ML
INJECTION, SOLUTION INTRAVENOUS CONTINUOUS
Status: DISCONTINUED | OUTPATIENT
Start: 2023-08-23 | End: 2023-08-23 | Stop reason: HOSPADM

## 2023-08-23 RX ORDER — TOBRAMYCIN 3 MG/ML
1 SOLUTION/ DROPS OPHTHALMIC
Status: COMPLETED | OUTPATIENT
Start: 2023-08-23 | End: 2023-08-23

## 2023-08-23 RX ORDER — PREDNISOLONE ACETATE 10 MG/ML
SUSPENSION/ DROPS OPHTHALMIC PRN
Status: DISCONTINUED | OUTPATIENT
Start: 2023-08-23 | End: 2023-08-23 | Stop reason: ALTCHOICE

## 2023-08-23 RX ORDER — TETRACAINE HYDROCHLORIDE 5 MG/ML
1 SOLUTION OPHTHALMIC SEE ADMIN INSTRUCTIONS
Status: COMPLETED | OUTPATIENT
Start: 2023-08-23 | End: 2023-08-23

## 2023-08-23 RX ORDER — SODIUM CHLORIDE 9 MG/ML
INJECTION, SOLUTION INTRAVENOUS CONTINUOUS PRN
Status: DISCONTINUED | OUTPATIENT
Start: 2023-08-23 | End: 2023-08-23 | Stop reason: SDUPTHER

## 2023-08-23 RX ORDER — MIDAZOLAM HYDROCHLORIDE 1 MG/ML
INJECTION INTRAMUSCULAR; INTRAVENOUS PRN
Status: DISCONTINUED | OUTPATIENT
Start: 2023-08-23 | End: 2023-08-23 | Stop reason: SDUPTHER

## 2023-08-23 RX ORDER — TROPICAMIDE 10 MG/ML
1 SOLUTION/ DROPS OPHTHALMIC SEE ADMIN INSTRUCTIONS
Status: COMPLETED | OUTPATIENT
Start: 2023-08-23 | End: 2023-08-23

## 2023-08-23 RX ADMIN — ETOMIDATE 8 MG: 20 INJECTION, SOLUTION INTRAVENOUS at 13:04

## 2023-08-23 RX ADMIN — Medication 1 DROP: at 12:43

## 2023-08-23 RX ADMIN — TETRACAINE HYDROCHLORIDE 1 DROP: 5 SOLUTION OPHTHALMIC at 12:32

## 2023-08-23 RX ADMIN — Medication 1 DROP: at 12:32

## 2023-08-23 RX ADMIN — PHENYLEPHRINE HYDROCHLORIDE 1 DROP: 25 SOLUTION/ DROPS OPHTHALMIC at 12:32

## 2023-08-23 RX ADMIN — SODIUM CHLORIDE: 9 INJECTION, SOLUTION INTRAVENOUS at 12:56

## 2023-08-23 RX ADMIN — PHENYLEPHRINE HYDROCHLORIDE 1 DROP: 25 SOLUTION/ DROPS OPHTHALMIC at 12:26

## 2023-08-23 RX ADMIN — PHENYLEPHRINE HYDROCHLORIDE 1 DROP: 25 SOLUTION/ DROPS OPHTHALMIC at 12:43

## 2023-08-23 RX ADMIN — MIDAZOLAM 2 MG: 1 INJECTION INTRAMUSCULAR; INTRAVENOUS at 12:56

## 2023-08-23 RX ADMIN — Medication 1 DROP: at 12:26

## 2023-08-23 RX ADMIN — TETRACAINE HYDROCHLORIDE 1 DROP: 5 SOLUTION OPHTHALMIC at 12:43

## 2023-08-23 RX ADMIN — TETRACAINE HYDROCHLORIDE 1 DROP: 5 SOLUTION OPHTHALMIC at 12:26

## 2023-08-23 ASSESSMENT — LIFESTYLE VARIABLES: SMOKING_STATUS: 0

## 2023-08-23 NOTE — DISCHARGE INSTRUCTIONS
REMOVE PATCH AND SHIELD IN am TOMORROW. NO HEAVY LIFTING/BENDING.     Eye drops to left eyeas follows:    Prednisolone Acetate `1% one drop every 2 hours while awake  Ketorolac one drop 3X daily  Ciprofloxacin one drop 3X daily    Tobradex ointment to left eye at bedtime

## 2023-08-23 NOTE — BRIEF OP NOTE
Brief Postoperative Note      Patient: Macario Pollard  YOB: 1950  MRN: [id number]    Date of Procedure: 8/23/2023    Pre-Op Diagnosis Codes:     * Combined forms of age-related cataract of left eye [H25.812]    Post-Op Diagnosis: Same       Procedure(s):  LEFT EYE CATARACT EXTRACTION IOL IMPLANT    Surgeon(s):  Jet Glass MD    Assistant:  * No surgical staff found *    Anesthesia: Monitor Anesthesia Care    Estimated Blood Loss (mL): Minimal    Complications: None    Specimens:   * No specimens in log *    Implants:  Implant Name Type Inv.  Item Serial No.  Lot No. LRB No. Used Action   LENS INTOCU 6.0 TO 30.0 DIOPT 118.7 A CONSTANT 0DEG ANG - [id number]  LENS INTOCU 6.0 TO 30.0 DIOPT 118.7 A CONSTANT 0DEG ANG [id number]  Left 1 Implanted         Drains: * No LDAs found *    Findings: NONE      Electronically signed by Jet Glass MD on 8/23/2023 at 1:44 PM

## 2023-08-24 NOTE — OP NOTE
[address]                  [address]                                OPERATIVE REPORT    PATIENT NAME: Fabrice Pino                    :        1950  MED REC NO:   [id number]                            ROOM:  ACCOUNT NO:   [de-identified]                           ADMIT DATE: 2023  PROVIDER:     Hilda Feldman MD    DATE OF PROCEDURE:  2023    PREOPERATIVE DIAGNOSIS:  Dense nuclear sclerotic cataract, left eye. POSTOPERATIVE DIAGNOSIS:  Dense nuclear sclerotic cataract, left eye. PROCEDURE PERFORMED:  Phacoemulsification of lens, posterior chamber  intraocular lens implant. ANESTHESIA:  Local MAC. COMPLICATIONS:  None. SURGEON:  Hilda Feldman MD.    INDICATIONS FOR PROCEDURE:  The patient is a pleasant 71-year-old  gentleman noting decline in vision in his left eye. With risks,  benefits, and alternatives discussed with him, he wished to proceed with  the above procedure in order to improve upon his vision. DESCRIPTION OF OPERATION:  The patient was brought to the operating  room, placed in the supine position on the operating table. He was  administered IV sedation. While under it, he was given a 3 mL of  retrobulbar nerve block to the left eye consisting of a 50:50 mixture of  0.75% Marcaine and 2% lidocaine. He was prepped and draped in sterile  fashion for intraocular surgery. Attention was directed to the left  eye, where a lid speculum was placed and operating microscope brought  into view. A paracentesis was made at the 6 o'clock position in the eye and the eye  entered temporally with a 2.4-mm keratome. Lidocaine was injected in  the anterior chamber and a mixture of epinephrine and lidocaine was  injected in the anterior chamber and viscoelastic was injected in the  anterior chamber as well.   A 5-mm capsulorrhexis was fashioned in the  usual manner and hydrodissection of the lens took place

## 2023-09-14 LAB
ANION GAP IN SER/PLAS: 13 MMOL/L (ref 10–20)
CALCIUM (MG/DL) IN SER/PLAS: 9.6 MG/DL (ref 8.6–10.3)
CARBON DIOXIDE, TOTAL (MMOL/L) IN SER/PLAS: 31 MMOL/L (ref 21–32)
CHLORIDE (MMOL/L) IN SER/PLAS: 102 MMOL/L (ref 98–107)
CREATININE (MG/DL) IN SER/PLAS: 1.07 MG/DL (ref 0.5–1.3)
GFR MALE: 73 ML/MIN/1.73M2
GLUCOSE (MG/DL) IN SER/PLAS: 106 MG/DL (ref 74–99)
POTASSIUM (MMOL/L) IN SER/PLAS: 3.8 MMOL/L (ref 3.5–5.3)
SODIUM (MMOL/L) IN SER/PLAS: 142 MMOL/L (ref 136–145)
UREA NITROGEN (MG/DL) IN SER/PLAS: 17 MG/DL (ref 6–23)

## 2023-10-13 ENCOUNTER — APPOINTMENT (OUTPATIENT)
Dept: CARDIOLOGY | Facility: HOSPITAL | Age: 73
End: 2023-10-13
Payer: MEDICARE

## 2023-10-16 ENCOUNTER — APPOINTMENT (OUTPATIENT)
Dept: CARDIOLOGY | Facility: CLINIC | Age: 73
End: 2023-10-16
Payer: MEDICARE

## 2023-11-21 ENCOUNTER — HOSPITAL ENCOUNTER (OUTPATIENT)
Dept: CARDIOLOGY | Facility: HOSPITAL | Age: 73
Discharge: HOME | End: 2023-11-21
Payer: MEDICARE

## 2023-11-21 ENCOUNTER — OFFICE VISIT (OUTPATIENT)
Dept: CARDIOLOGY | Facility: HOSPITAL | Age: 73
End: 2023-11-21
Payer: MEDICARE

## 2023-11-21 VITALS
DIASTOLIC BLOOD PRESSURE: 87 MMHG | WEIGHT: 231.2 LBS | HEART RATE: 60 BPM | OXYGEN SATURATION: 98 % | SYSTOLIC BLOOD PRESSURE: 148 MMHG | RESPIRATION RATE: 20 BRPM | BODY MASS INDEX: 33.17 KG/M2

## 2023-11-21 DIAGNOSIS — I50.22 CHRONIC SYSTOLIC HEART FAILURE (MULTI): ICD-10-CM

## 2023-11-21 DIAGNOSIS — I42.8 NON-ISCHEMIC CARDIOMYOPATHY (MULTI): ICD-10-CM

## 2023-11-21 DIAGNOSIS — I50.22 CHRONIC SYSTOLIC HEART FAILURE (MULTI): Primary | ICD-10-CM

## 2023-11-21 DIAGNOSIS — I10 HYPERTENSION, UNSPECIFIED TYPE: ICD-10-CM

## 2023-11-21 LAB
ATRIAL RATE: 0 BPM
P AXIS: 53 DEGREES
PR INTERVAL: 223 MS
Q ONSET: 251 MS
QRS COUNT: 9 BEATS
QRS DURATION: 119 MS
QT INTERVAL: 422 MS
QTC CALCULATION(BAZETT): 411 MS
QTC FREDERICIA: 414 MS
R AXIS: -8 DEGREES
T AXIS: 126 DEGREES
T OFFSET: 462 MS
VENTRICULAR RATE: 57 BPM

## 2023-11-21 PROCEDURE — 93005 ELECTROCARDIOGRAM TRACING: CPT

## 2023-11-21 PROCEDURE — 3079F DIAST BP 80-89 MM HG: CPT | Performed by: NURSE PRACTITIONER

## 2023-11-21 PROCEDURE — 93010 ELECTROCARDIOGRAM REPORT: CPT | Performed by: INTERNAL MEDICINE

## 2023-11-21 PROCEDURE — 1126F AMNT PAIN NOTED NONE PRSNT: CPT | Performed by: NURSE PRACTITIONER

## 2023-11-21 PROCEDURE — 99214 OFFICE O/P EST MOD 30 MIN: CPT | Performed by: NURSE PRACTITIONER

## 2023-11-21 PROCEDURE — 3077F SYST BP >= 140 MM HG: CPT | Performed by: NURSE PRACTITIONER

## 2023-11-21 PROCEDURE — 99214 OFFICE O/P EST MOD 30 MIN: CPT | Mod: 25 | Performed by: NURSE PRACTITIONER

## 2023-11-21 PROCEDURE — 1036F TOBACCO NON-USER: CPT | Performed by: NURSE PRACTITIONER

## 2023-11-21 RX ORDER — CARVEDILOL 12.5 MG/1
6.25 TABLET ORAL
COMMUNITY
End: 2024-04-05 | Stop reason: SDUPTHER

## 2023-11-21 RX ORDER — EPLERENONE 50 MG/1
50 TABLET, FILM COATED ORAL DAILY
COMMUNITY
Start: 2022-03-09 | End: 2024-04-05 | Stop reason: SDUPTHER

## 2023-11-21 RX ORDER — TERAZOSIN 10 MG/1
1 CAPSULE ORAL NIGHTLY
COMMUNITY
Start: 2017-04-03 | End: 2024-02-23 | Stop reason: ALTCHOICE

## 2023-11-21 RX ORDER — SACUBITRIL AND VALSARTAN 97; 103 MG/1; MG/1
1 TABLET, FILM COATED ORAL 2 TIMES DAILY
COMMUNITY

## 2023-11-21 RX ORDER — HYDROCHLOROTHIAZIDE 25 MG/1
25 TABLET ORAL DAILY
COMMUNITY
End: 2023-11-21 | Stop reason: SDUPTHER

## 2023-11-21 RX ORDER — HYDROCHLOROTHIAZIDE 25 MG/1
25 TABLET ORAL DAILY
Qty: 90 TABLET | Refills: 1 | Status: SHIPPED | OUTPATIENT
Start: 2023-11-21 | End: 2024-02-23 | Stop reason: SDUPTHER

## 2023-11-21 RX ORDER — HYDRALAZINE HYDROCHLORIDE 50 MG/1
50 TABLET, FILM COATED ORAL 2 TIMES DAILY
Qty: 180 TABLET | Refills: 3 | Status: SHIPPED | OUTPATIENT
Start: 2023-11-21 | End: 2024-02-23 | Stop reason: SDUPTHER

## 2023-11-21 RX ORDER — OMEPRAZOLE 20 MG/1
1 CAPSULE, DELAYED RELEASE ORAL DAILY
COMMUNITY
Start: 2022-02-26

## 2023-11-21 RX ORDER — MULTIVITAMIN
1 TABLET ORAL
COMMUNITY
Start: 2017-04-17 | End: 2023-11-21 | Stop reason: ALTCHOICE

## 2023-11-21 RX ORDER — HYDRALAZINE HYDROCHLORIDE 50 MG/1
50 TABLET, FILM COATED ORAL 2 TIMES DAILY
COMMUNITY
End: 2023-11-21 | Stop reason: SDUPTHER

## 2023-11-21 ASSESSMENT — ENCOUNTER SYMPTOMS
CHEST TIGHTNESS: 0
ABDOMINAL DISTENTION: 0
SHORTNESS OF BREATH: 0
CONFUSION: 0
BLOOD IN STOOL: 0
DEPRESSION: 0
CHILLS: 0
WHEEZING: 0
FEVER: 0
COUGH: 0
LIGHT-HEADEDNESS: 0
HEMATURIA: 0
LOSS OF SENSATION IN FEET: 0
WEAKNESS: 0
PALPITATIONS: 0
OCCASIONAL FEELINGS OF UNSTEADINESS: 0
ACTIVITY CHANGE: 0
EYES NEGATIVE: 1

## 2023-11-21 ASSESSMENT — PATIENT HEALTH QUESTIONNAIRE - PHQ9
2. FEELING DOWN, DEPRESSED OR HOPELESS: NOT AT ALL
SUM OF ALL RESPONSES TO PHQ9 QUESTIONS 1 AND 2: 0
1. LITTLE INTEREST OR PLEASURE IN DOING THINGS: NOT AT ALL

## 2023-11-21 ASSESSMENT — COLUMBIA-SUICIDE SEVERITY RATING SCALE - C-SSRS
6. HAVE YOU EVER DONE ANYTHING, STARTED TO DO ANYTHING, OR PREPARED TO DO ANYTHING TO END YOUR LIFE?: NO
1. IN THE PAST MONTH, HAVE YOU WISHED YOU WERE DEAD OR WISHED YOU COULD GO TO SLEEP AND NOT WAKE UP?: NO
2. HAVE YOU ACTUALLY HAD ANY THOUGHTS OF KILLING YOURSELF?: NO

## 2023-11-21 ASSESSMENT — PAIN SCALES - GENERAL: PAINLEVEL: 0-NO PAIN

## 2023-11-21 NOTE — PROGRESS NOTES
Subjective   Patient ID: Dragan Prince is a 73 y.o. male who presents for follow-up of congestive heart failure.     Current Outpatient Medications:     aspirin-calcium carbonate 81 mg-300 mg calcium(777 mg) tablet, Take 1 tablet by mouth once daily., Disp: , Rfl:     carvedilol (Coreg) 12.5 mg tablet, Take 1 tablet (12.5 mg) by mouth 2 times a day with meals., Disp: , Rfl:     eplerenone (Inspra) 50 mg tablet, Take 1 tablet (50 mg) by mouth once daily., Disp: , Rfl:     hydrALAZINE (Apresoline) 50 mg tablet, Take 1 tablet (50 mg) by mouth 2 times a day., Disp: , Rfl:     hydroCHLOROthiazide (HYDRODiuril) 25 mg tablet, Take 1 tablet (25 mg) by mouth once daily., Disp: , Rfl:     omeprazole (PriLOSEC) 20 mg DR capsule, Take 1 capsule (20 mg) by mouth once daily., Disp: , Rfl:     sacubitriL-valsartan (Entresto)  mg tablet, Take 1 tablet by mouth 2 times a day., Disp: , Rfl:     terazosin (Hytrin) 10 mg capsule, Take 1 capsule (10 mg) by mouth once daily at bedtime., Disp: , Rfl:      HPI   Patient presents for follow up of chronic heart failure. Current symptoms include: none. He denies chest pressure/discomfort, lower extremity edema, near-syncope, orthopnea, palpitations, paroxysmal nocturnal dyspnea, and syncope. He states he is compliant all of the time with his medications. He states he is compliant most of the time with his diet.    Review of Systems   Constitutional:  Negative for activity change, chills and fever.   HENT:  Negative for hearing loss.    Eyes: Negative.    Respiratory:  Negative for cough, chest tightness, shortness of breath and wheezing.    Cardiovascular:  Negative for chest pain, palpitations and leg swelling.   Gastrointestinal:  Negative for abdominal distention and blood in stool.   Genitourinary:  Negative for hematuria.   Neurological:  Negative for syncope, weakness and light-headedness.   Psychiatric/Behavioral:  Negative for confusion.        Objective     /87 (BP  Location: Right arm, Patient Position: Sitting)   Pulse 60   Resp 20   Wt 105 kg (231 lb 3.2 oz)   SpO2 98%   BMI 33.17 kg/m²     .[id number]    4/2023 Echocardiogram   CONCLUSIONS:  1. Left ventricular systolic function is moderately decreased with a 25-30% estimated ejection fraction.  2. Spectral Doppler shows a pseudonormal pattern of left ventricular diastolic filling.  3. There is moderate concentric left ventricular hypertrophy.  4. The left atrium is severely dilated.  5. There is global hypokinesis of the left ventricle with minor regional variations.    4/2023 CPET   Impression:     1. Abnormal cardiopulmonary exercise test.  2. Please refer to separate PFT results as well.  3. Parham functional class C (moderate to severe impairment).  4. Probable cause of functional impairment: Cardiac.  5. RER > 1 indicates good effort.  6. Peak VO2 of 14.1 mL/kg/min and/or VE/VC02 of 35 indicates high risk of complications.  7. Maximize guideline directed medical therapy.  8. Refer for advanced HF consultation.    5/2022 [facility]   CONCLUSIONS:   1. RHC:         RA: 5      RV: 28/5      PA: 28/12      PWP: 12         CO: 8.0 CI: 3.6.   2. Co-dominant coronary anatomy.   3. Mild non-onbstructive CAD.   4. Normal LVEDP.   5. No evidence of aortic stenosis on pullback.    Lab Results   Component Value Date    BUN 17 09/14/2023    CREATININE 1.07 09/14/2023     (H) 12/02/2022    MG 2.22 01/31/2023    K 3.8 09/14/2023     09/14/2023       Constitutional:       General: NAD   HENT:   Normocephalic.  No other gross abnormality.   No JVD or hepatojugular reflex.  Cardiovascular:      Rate and Rhythm: Normal rate and regular rhythm.      Heart sounds: Normal heart sounds. No murmur heard.   Pulmonary:      Effort: Pulmonary effort is normal.      Breath sounds: Normal breath sounds.   Abdominal:      General: Abdomen is flat. Bowel sounds are normal.      Palpations: Abdomen is soft.   Musculoskeletal:          General: No swelling.   Skin:     General: Skin is warm and dry.        Assessment/Plan     Problem List Items Addressed This Visit          Cardiac and Vasculature    Chronic systolic heart failure (CMS/HCC) - Primary    Relevant Medications    carvedilol (Coreg) 12.5 mg tablet    sacubitriL-valsartan (Entresto)  mg tablet     Other Visit Diagnoses       Non-ischemic cardiomyopathy (CMS/HCC)        Relevant Medications    carvedilol (Coreg) 12.5 mg tablet    sacubitriL-valsartan (Entresto)  mg tablet    Hypertension, unspecified type                 Chronic  systolic heart failure   Etiology non ischemic   AHA Stage:  NYHA class:  Volume Status:   GFR: 73    GDMT:  BB- carvedilol 12.5 mg 1 tablet twice a day   -- HR is 60 BPM   ARB/ACEI/ARNI -  Entresto 97/13 mg 1 tablet twice a day   MRA - eplerenone   SGLT2i -  did not tolerate   Diuretic -  HCTZ  Device Therapy:  Has not followed up with Dr. Mac as advised.   Hydralazine 50 mg 1 tablet twice     CHF: well controlled and no significant medication side effects noted.  Last EF 25-30 % in April.   Will reassess LV with limited echocardiogram.   If EF remains less than 35% then he will need to follow up with Dr. Mac.   He verbalizes awareness of risk for SCD without ICD.      He is complaining of fatigue intermittently.     Emphasized salt restriction.  Encouraged daily monitoring of the patient's weight.  Encouraged regular exercise.  Labs ordered today.  Follow up in 3 months.    2. HTN:   variable control per home log.  He does have lightheadedness with SBP @ 100 mm hg.   Will continue current medications for now.       Addendum:  EKG shows sinus arrhythmia with HR 57 BPM.    msec, QRSG 119 msec QT/Qtc 422/411 No acute changes.         Jessica Hernandez, APRN-CNP

## 2023-11-21 NOTE — PATIENT INSTRUCTIONS
Thank you for coming in today.  If you have any questions you may contact the office Monday through Friday at [phone] or on week ends at [phone].     Please continue current medications.  Eat prior to a.m. medications without fail.     Please get lab work completed.     Please schedule echocardiogram     Please follow  a 2 GM sodium diet and limit fluid intake to 2 liters per day or 8 servings ( serving size = 8 oz. = 1 cup = 240 ml) per day.   Please avoid processed meat products (luncheon meats, sausages, mccrary, hot dogs for example) eat 4 servings of vegetables and 1-2 whole servings of whole fruits per day.   Please weigh daily and call [phone] for weight gain of 3 pounds in 24 hours or 5 pounds or if you experience increased swelling or shortness of breath.     Follow up in 3 months.

## 2023-11-29 ENCOUNTER — HOSPITAL ENCOUNTER (OUTPATIENT)
Age: 73
Setting detail: OBSERVATION
Discharge: HOME | End: 2023-12-02
Attending: EMERGENCY MEDICINE | Admitting: INTERNAL MEDICINE
Payer: MEDICARE

## 2023-11-29 ENCOUNTER — APPOINTMENT (OUTPATIENT)
Dept: GENERAL RADIOLOGY | Age: 73
End: 2023-11-29
Payer: MEDICARE

## 2023-11-29 ENCOUNTER — APPOINTMENT (OUTPATIENT)
Dept: CT IMAGING | Age: 73
End: 2023-11-29
Payer: MEDICARE

## 2023-11-29 DIAGNOSIS — I95.9 HYPOTENSION, UNSPECIFIED HYPOTENSION TYPE: ICD-10-CM

## 2023-11-29 DIAGNOSIS — R07.9 CHEST PAIN, UNSPECIFIED TYPE: Primary | ICD-10-CM

## 2023-11-29 LAB
ALBUMIN SERPL-MCNC: 4.1 G/DL (ref 3.5–5.2)
ALP SERPL-CCNC: 46 U/L (ref 40–129)
ALT SERPL-CCNC: 14 U/L (ref 0–40)
ANION GAP SERPL CALCULATED.3IONS-SCNC: 12 MMOL/L (ref 7–16)
AST SERPL-CCNC: 23 U/L (ref 0–39)
BASOPHILS # BLD: 0.02 K/UL (ref 0–0.2)
BASOPHILS NFR BLD: 0 % (ref 0–2)
BILIRUB SERPL-MCNC: 0.8 MG/DL (ref 0–1.2)
BNP SERPL-MCNC: 179 PG/ML (ref 0–450)
BUN SERPL-MCNC: 24 MG/DL (ref 6–23)
CALCIUM SERPL-MCNC: 9.5 MG/DL (ref 8.6–10.2)
CHLORIDE SERPL-SCNC: 100 MMOL/L (ref 98–107)
CO2 SERPL-SCNC: 27 MMOL/L (ref 22–29)
CREAT SERPL-MCNC: 1.6 MG/DL (ref 0.7–1.2)
EOSINOPHIL # BLD: 0.04 K/UL (ref 0.05–0.5)
EOSINOPHILS RELATIVE PERCENT: 1 % (ref 0–6)
ERYTHROCYTE [DISTWIDTH] IN BLOOD BY AUTOMATED COUNT: 12.6 % (ref 11.5–15)
GFR SERPL CREATININE-BSD FRML MDRD: 47 ML/MIN/1.73M2
GLUCOSE SERPL-MCNC: 178 MG/DL (ref 74–99)
HCT VFR BLD AUTO: 39.9 % (ref 37–54)
HGB BLD-MCNC: 13.6 G/DL (ref 12.5–16.5)
IMM GRANULOCYTES # BLD AUTO: <0.03 K/UL (ref 0–0.58)
IMM GRANULOCYTES NFR BLD: 0 % (ref 0–5)
LACTATE BLDV-SCNC: 1.6 MMOL/L (ref 0.5–2.2)
LYMPHOCYTES NFR BLD: 1.71 K/UL (ref 1.5–4)
LYMPHOCYTES RELATIVE PERCENT: 27 % (ref 20–42)
MAGNESIUM SERPL-MCNC: 2 MG/DL (ref 1.6–2.6)
MCH RBC QN AUTO: 30 PG (ref 26–35)
MCHC RBC AUTO-ENTMCNC: 34.1 G/DL (ref 32–34.5)
MCV RBC AUTO: 88.1 FL (ref 80–99.9)
MONOCYTES NFR BLD: 0.55 K/UL (ref 0.1–0.95)
MONOCYTES NFR BLD: 9 % (ref 2–12)
NEUTROPHILS NFR BLD: 63 % (ref 43–80)
NEUTS SEG NFR BLD: 3.98 K/UL (ref 1.8–7.3)
PLATELET # BLD AUTO: 239 K/UL (ref 130–450)
PMV BLD AUTO: 9.8 FL (ref 7–12)
POTASSIUM SERPL-SCNC: 4.5 MMOL/L (ref 3.5–5)
PROT SERPL-MCNC: 7 G/DL (ref 6.4–8.3)
RBC # BLD AUTO: 4.53 M/UL (ref 3.8–5.8)
SODIUM SERPL-SCNC: 139 MMOL/L (ref 132–146)
TROPONIN I SERPL HS-MCNC: 20 NG/L (ref 0–11)
TROPONIN I SERPL HS-MCNC: 22 NG/L (ref 0–11)
WBC OTHER # BLD: 6.3 K/UL (ref 4.5–11.5)

## 2023-11-29 PROCEDURE — 80053 COMPREHEN METABOLIC PANEL: CPT

## 2023-11-29 PROCEDURE — 85025 COMPLETE CBC W/AUTO DIFF WBC: CPT

## 2023-11-29 PROCEDURE — 93005 ELECTROCARDIOGRAM TRACING: CPT | Performed by: EMERGENCY MEDICINE

## 2023-11-29 PROCEDURE — 84484 ASSAY OF TROPONIN QUANT: CPT

## 2023-11-29 PROCEDURE — 6360000004 HC RX CONTRAST MEDICATION: Performed by: RADIOLOGY

## 2023-11-29 PROCEDURE — 96360 HYDRATION IV INFUSION INIT: CPT

## 2023-11-29 PROCEDURE — G0378 HOSPITAL OBSERVATION PER HR: HCPCS

## 2023-11-29 PROCEDURE — 2580000003 HC RX 258: Performed by: EMERGENCY MEDICINE

## 2023-11-29 PROCEDURE — 83880 ASSAY OF NATRIURETIC PEPTIDE: CPT

## 2023-11-29 PROCEDURE — 99285 EMERGENCY DEPT VISIT HI MDM: CPT

## 2023-11-29 PROCEDURE — 71045 X-RAY EXAM CHEST 1 VIEW: CPT

## 2023-11-29 PROCEDURE — 83605 ASSAY OF LACTIC ACID: CPT

## 2023-11-29 PROCEDURE — 83735 ASSAY OF MAGNESIUM: CPT

## 2023-11-29 PROCEDURE — 71275 CT ANGIOGRAPHY CHEST: CPT

## 2023-11-29 RX ORDER — MAGNESIUM SULFATE IN WATER 40 MG/ML
2000 INJECTION, SOLUTION INTRAVENOUS PRN
Status: DISCONTINUED | OUTPATIENT
Start: 2023-11-29 | End: 2023-12-02 | Stop reason: HOSPADM

## 2023-11-29 RX ORDER — GLUCAGON 1 MG/ML
1 KIT INJECTION PRN
Status: DISCONTINUED | OUTPATIENT
Start: 2023-11-29 | End: 2023-12-02 | Stop reason: HOSPADM

## 2023-11-29 RX ORDER — CARVEDILOL 6.25 MG/1
12.5 TABLET ORAL 2 TIMES DAILY WITH MEALS
Status: DISCONTINUED | OUTPATIENT
Start: 2023-11-30 | End: 2023-12-02 | Stop reason: HOSPADM

## 2023-11-29 RX ORDER — ACETAMINOPHEN 650 MG/1
650 SUPPOSITORY RECTAL EVERY 6 HOURS PRN
Status: DISCONTINUED | OUTPATIENT
Start: 2023-11-29 | End: 2023-12-02 | Stop reason: HOSPADM

## 2023-11-29 RX ORDER — PANTOPRAZOLE SODIUM 40 MG/1
40 TABLET, DELAYED RELEASE ORAL
Status: DISCONTINUED | OUTPATIENT
Start: 2023-11-30 | End: 2023-12-02 | Stop reason: HOSPADM

## 2023-11-29 RX ORDER — ENOXAPARIN SODIUM 100 MG/ML
40 INJECTION SUBCUTANEOUS DAILY
Status: DISCONTINUED | OUTPATIENT
Start: 2023-11-30 | End: 2023-11-29 | Stop reason: SDUPTHER

## 2023-11-29 RX ORDER — DOXAZOSIN 2 MG/1
8 TABLET ORAL DAILY
Status: DISCONTINUED | OUTPATIENT
Start: 2023-11-30 | End: 2023-12-02 | Stop reason: HOSPADM

## 2023-11-29 RX ORDER — TERAZOSIN 5 MG/1
10 CAPSULE ORAL NIGHTLY
Status: DISCONTINUED | OUTPATIENT
Start: 2023-11-29 | End: 2023-11-29 | Stop reason: CLARIF

## 2023-11-29 RX ORDER — ASPIRIN 81 MG/1
81 TABLET ORAL DAILY
Status: DISCONTINUED | OUTPATIENT
Start: 2023-11-30 | End: 2023-12-02 | Stop reason: HOSPADM

## 2023-11-29 RX ORDER — POTASSIUM CHLORIDE 7.45 MG/ML
10 INJECTION INTRAVENOUS PRN
Status: DISCONTINUED | OUTPATIENT
Start: 2023-11-29 | End: 2023-12-02 | Stop reason: HOSPADM

## 2023-11-29 RX ORDER — ACETAMINOPHEN 325 MG/1
650 TABLET ORAL EVERY 6 HOURS PRN
Status: DISCONTINUED | OUTPATIENT
Start: 2023-11-29 | End: 2023-12-02 | Stop reason: HOSPADM

## 2023-11-29 RX ORDER — HYDRALAZINE HYDROCHLORIDE 50 MG/1
50 TABLET, FILM COATED ORAL 2 TIMES DAILY
Status: DISCONTINUED | OUTPATIENT
Start: 2023-11-29 | End: 2023-12-02 | Stop reason: HOSPADM

## 2023-11-29 RX ORDER — SODIUM CHLORIDE 0.9 % (FLUSH) 0.9 %
5-40 SYRINGE (ML) INJECTION EVERY 12 HOURS SCHEDULED
Status: DISCONTINUED | OUTPATIENT
Start: 2023-11-29 | End: 2023-12-02 | Stop reason: HOSPADM

## 2023-11-29 RX ORDER — DEXTROSE MONOHYDRATE 100 MG/ML
INJECTION, SOLUTION INTRAVENOUS CONTINUOUS PRN
Status: DISCONTINUED | OUTPATIENT
Start: 2023-11-29 | End: 2023-12-02 | Stop reason: HOSPADM

## 2023-11-29 RX ORDER — POLYETHYLENE GLYCOL 3350 17 G/17G
17 POWDER, FOR SOLUTION ORAL DAILY PRN
Status: DISCONTINUED | OUTPATIENT
Start: 2023-11-29 | End: 2023-12-02 | Stop reason: HOSPADM

## 2023-11-29 RX ORDER — ENOXAPARIN SODIUM 100 MG/ML
40 INJECTION SUBCUTANEOUS DAILY
Status: DISCONTINUED | OUTPATIENT
Start: 2023-11-30 | End: 2023-12-02 | Stop reason: HOSPADM

## 2023-11-29 RX ORDER — SODIUM CHLORIDE 0.9 % (FLUSH) 0.9 %
5-40 SYRINGE (ML) INJECTION PRN
Status: DISCONTINUED | OUTPATIENT
Start: 2023-11-29 | End: 2023-12-02 | Stop reason: HOSPADM

## 2023-11-29 RX ORDER — 0.9 % SODIUM CHLORIDE 0.9 %
1000 INTRAVENOUS SOLUTION INTRAVENOUS ONCE
Status: COMPLETED | OUTPATIENT
Start: 2023-11-29 | End: 2023-11-29

## 2023-11-29 RX ORDER — POTASSIUM CHLORIDE 20 MEQ/1
40 TABLET, EXTENDED RELEASE ORAL PRN
Status: DISCONTINUED | OUTPATIENT
Start: 2023-11-29 | End: 2023-12-02 | Stop reason: HOSPADM

## 2023-11-29 RX ORDER — SODIUM CHLORIDE 9 MG/ML
INJECTION, SOLUTION INTRAVENOUS PRN
Status: DISCONTINUED | OUTPATIENT
Start: 2023-11-29 | End: 2023-12-02 | Stop reason: HOSPADM

## 2023-11-29 RX ORDER — OMEPRAZOLE 20 MG/1
20 CAPSULE, DELAYED RELEASE ORAL EVERY MORNING
Status: DISCONTINUED | OUTPATIENT
Start: 2023-11-30 | End: 2023-11-29 | Stop reason: CLARIF

## 2023-11-29 RX ADMIN — IOPAMIDOL 75 ML: 755 INJECTION, SOLUTION INTRAVENOUS at 22:43

## 2023-11-29 RX ADMIN — SODIUM CHLORIDE 1000 ML: 9 INJECTION, SOLUTION INTRAVENOUS at 19:29

## 2023-11-30 ENCOUNTER — APPOINTMENT (OUTPATIENT)
Dept: ULTRASOUND IMAGING | Age: 73
End: 2023-11-30
Payer: MEDICARE

## 2023-11-30 LAB
ALBUMIN SERPL-MCNC: 3.7 G/DL (ref 3.5–5.2)
ALP SERPL-CCNC: 40 U/L (ref 40–129)
ALT SERPL-CCNC: 12 U/L (ref 0–40)
ANION GAP SERPL CALCULATED.3IONS-SCNC: 9 MMOL/L (ref 7–16)
AST SERPL-CCNC: 18 U/L (ref 0–39)
BASOPHILS # BLD: 0.03 K/UL (ref 0–0.2)
BASOPHILS NFR BLD: 1 % (ref 0–2)
BILIRUB SERPL-MCNC: 0.7 MG/DL (ref 0–1.2)
BUN SERPL-MCNC: 23 MG/DL (ref 6–23)
CALCIUM SERPL-MCNC: 9 MG/DL (ref 8.6–10.2)
CEA SERPL-MCNC: 0.8 NG/ML (ref 0–5.2)
CHLORIDE SERPL-SCNC: 103 MMOL/L (ref 98–107)
CO2 SERPL-SCNC: 28 MMOL/L (ref 22–29)
CREAT SERPL-MCNC: 1.3 MG/DL (ref 0.7–1.2)
CRP SERPL HS-MCNC: 8 MG/L (ref 0–5)
EOSINOPHIL # BLD: 0.14 K/UL (ref 0.05–0.5)
EOSINOPHILS RELATIVE PERCENT: 2 % (ref 0–6)
ERYTHROCYTE [DISTWIDTH] IN BLOOD BY AUTOMATED COUNT: 12.7 % (ref 11.5–15)
ERYTHROCYTE [SEDIMENTATION RATE] IN BLOOD BY WESTERGREN METHOD: 10 MM/HR (ref 0–15)
GFR SERPL CREATININE-BSD FRML MDRD: >60 ML/MIN/1.73M2
GLUCOSE SERPL-MCNC: 93 MG/DL (ref 74–99)
HCT VFR BLD AUTO: 36.5 % (ref 37–54)
HGB BLD-MCNC: 12.1 G/DL (ref 12.5–16.5)
IMM GRANULOCYTES # BLD AUTO: 0.05 K/UL (ref 0–0.58)
IMM GRANULOCYTES NFR BLD: 1 % (ref 0–5)
LYMPHOCYTES NFR BLD: 2.19 K/UL (ref 1.5–4)
LYMPHOCYTES RELATIVE PERCENT: 34 % (ref 20–42)
MAGNESIUM SERPL-MCNC: 2 MG/DL (ref 1.6–2.6)
MCH RBC QN AUTO: 29.7 PG (ref 26–35)
MCHC RBC AUTO-ENTMCNC: 33.2 G/DL (ref 32–34.5)
MCV RBC AUTO: 89.7 FL (ref 80–99.9)
MONOCYTES NFR BLD: 0.62 K/UL (ref 0.1–0.95)
MONOCYTES NFR BLD: 10 % (ref 2–12)
NEUTROPHILS NFR BLD: 53 % (ref 43–80)
NEUTS SEG NFR BLD: 3.44 K/UL (ref 1.8–7.3)
PHOSPHATE SERPL-MCNC: 3.4 MG/DL (ref 2.5–4.5)
PLATELET # BLD AUTO: 197 K/UL (ref 130–450)
PMV BLD AUTO: 9.9 FL (ref 7–12)
POTASSIUM SERPL-SCNC: 3.7 MMOL/L (ref 3.5–5)
PROCALCITONIN SERPL-MCNC: 0.09 NG/ML (ref 0–0.08)
PROT SERPL-MCNC: 6.3 G/DL (ref 6.4–8.3)
RBC # BLD AUTO: 4.07 M/UL (ref 3.8–5.8)
SARS-COV-2 RDRP RESP QL NAA+PROBE: DETECTED
SODIUM SERPL-SCNC: 140 MMOL/L (ref 132–146)
SPECIMEN DESCRIPTION: ABNORMAL
T4 FREE SERPL-MCNC: 1.5 NG/DL (ref 0.9–1.7)
TSH SERPL DL<=0.05 MIU/L-ACNC: 0.75 UIU/ML (ref 0.27–4.2)
WBC OTHER # BLD: 6.5 K/UL (ref 4.5–11.5)

## 2023-11-30 PROCEDURE — 83735 ASSAY OF MAGNESIUM: CPT

## 2023-11-30 PROCEDURE — 80053 COMPREHEN METABOLIC PANEL: CPT

## 2023-11-30 PROCEDURE — 2580000003 HC RX 258: Performed by: INTERNAL MEDICINE

## 2023-11-30 PROCEDURE — 85025 COMPLETE CBC W/AUTO DIFF WBC: CPT

## 2023-11-30 PROCEDURE — 96361 HYDRATE IV INFUSION ADD-ON: CPT

## 2023-11-30 PROCEDURE — 85652 RBC SED RATE AUTOMATED: CPT

## 2023-11-30 PROCEDURE — G0378 HOSPITAL OBSERVATION PER HR: HCPCS

## 2023-11-30 PROCEDURE — 6370000000 HC RX 637 (ALT 250 FOR IP): Performed by: INTERNAL MEDICINE

## 2023-11-30 PROCEDURE — 6370000000 HC RX 637 (ALT 250 FOR IP)

## 2023-11-30 PROCEDURE — 76705 ECHO EXAM OF ABDOMEN: CPT

## 2023-11-30 PROCEDURE — 36415 COLL VENOUS BLD VENIPUNCTURE: CPT

## 2023-11-30 PROCEDURE — 84145 PROCALCITONIN (PCT): CPT

## 2023-11-30 PROCEDURE — 84439 ASSAY OF FREE THYROXINE: CPT

## 2023-11-30 PROCEDURE — 84100 ASSAY OF PHOSPHORUS: CPT

## 2023-11-30 PROCEDURE — 86140 C-REACTIVE PROTEIN: CPT

## 2023-11-30 PROCEDURE — 82378 CARCINOEMBRYONIC ANTIGEN: CPT

## 2023-11-30 PROCEDURE — 84443 ASSAY THYROID STIM HORMONE: CPT

## 2023-11-30 PROCEDURE — 87635 SARS-COV-2 COVID-19 AMP PRB: CPT

## 2023-11-30 RX ORDER — SACUBITRIL AND VALSARTAN 97; 103 MG/1; MG/1
1 TABLET, FILM COATED ORAL 2 TIMES DAILY
COMMUNITY

## 2023-11-30 RX ORDER — 0.9 % SODIUM CHLORIDE 0.9 %
1000 INTRAVENOUS SOLUTION INTRAVENOUS ONCE
Status: COMPLETED | OUTPATIENT
Start: 2023-11-30 | End: 2023-11-30

## 2023-11-30 RX ORDER — HYDROCHLOROTHIAZIDE 25 MG/1
25 TABLET ORAL DAILY
Status: DISCONTINUED | OUTPATIENT
Start: 2023-11-30 | End: 2023-12-02 | Stop reason: HOSPADM

## 2023-11-30 RX ORDER — DEXAMETHASONE 6 MG/1
6 TABLET ORAL DAILY
Status: DISCONTINUED | OUTPATIENT
Start: 2023-11-30 | End: 2023-12-02 | Stop reason: HOSPADM

## 2023-11-30 RX ORDER — EPLERENONE 25 MG/1
50 TABLET, FILM COATED ORAL DAILY
Status: DISCONTINUED | OUTPATIENT
Start: 2023-11-30 | End: 2023-12-02 | Stop reason: HOSPADM

## 2023-11-30 RX ADMIN — SACUBITRIL AND VALSARTAN 1 TABLET: 49; 51 TABLET, FILM COATED ORAL at 22:27

## 2023-11-30 RX ADMIN — SODIUM CHLORIDE 1000 ML: 9 INJECTION, SOLUTION INTRAVENOUS at 03:00

## 2023-11-30 RX ADMIN — DEXAMETHASONE 6 MG: 6 TABLET ORAL at 09:40

## 2023-11-30 RX ADMIN — CARVEDILOL 12.5 MG: 6.25 TABLET, FILM COATED ORAL at 20:34

## 2023-11-30 RX ADMIN — ASPIRIN 81 MG: 81 TABLET, COATED ORAL at 09:40

## 2023-11-30 RX ADMIN — SACUBITRIL AND VALSARTAN 2 TABLET: 24; 26 TABLET, FILM COATED ORAL at 22:27

## 2023-11-30 RX ADMIN — Medication 10 ML: at 20:33

## 2023-11-30 RX ADMIN — Medication 10 ML: at 09:43

## 2023-11-30 ASSESSMENT — LIFESTYLE VARIABLES
HOW MANY STANDARD DRINKS CONTAINING ALCOHOL DO YOU HAVE ON A TYPICAL DAY: PATIENT DOES NOT DRINK
HOW OFTEN DO YOU HAVE A DRINK CONTAINING ALCOHOL: NEVER

## 2023-11-30 ASSESSMENT — PAIN SCALES - GENERAL: PAINLEVEL_OUTOF10: 0

## 2023-11-30 NOTE — PROGRESS NOTES
Internal Medicine Progress Note    [facility]    [facility]. Nirali Watts, AYAD Chaudhry D.O., BRYN Weiss, MSN, APRN, NP-C  [facility]. Jose Silvestre, MSN, APRN-CNP  My Padilla, MSN, APRN-CNP     Primary Care Physician: Patti Bumpers, DO   Admitting Physician:  Sonia Harrell DO  Admission date and time: 11/29/2023  7:12 PM    Room:  [address] [place] [address]  Admitting diagnosis: Chest pain [R07.9]  Hypotension, unspecified hypotension type [I95.9]  Chest pain, unspecified type [R07.9]    Patient Name: Idalia Guzman  MRN: [id number]    Date of Service: 11/30/2023     Subjective:  Caro Pascal is a 68 y.o. male who was seen and examined today,11/30/2023, at the bedside. Patient was seen in the emergency room. The patient is currently resting comfortably at the present time. The patient does have a history of colon cancer we have discussed the spiculated lesion noted on chest x-ray. We will discuss further intervention at this time. Otherwise the patient seems to be breathing easier    No family present during my examination. Review of System:   Constitutional:   Denies fever or chills, weight loss or gain. Complains of fatigue  HEENT:   Denies ear pain, sore throat, sinus or eye problems. Cardiovascular:   Denies any chest pain, irregular heartbeats, or palpitations. Respiratory:   Improved shortness of breath  Gastrointestinal:   Denies nausea, vomiting, diarrhea, or constipation. Denies any abdominal pain. Genitourinary:    Denies any urgency, frequency, hematuria. Voiding  without difficulty. Extremities:   Denies lower extremity swelling, edema or cyanosis. Neurology:    Denies any headache or focal neurological deficits, Denies generalized weakness or memory difficulty. Psch:   Denies being anxious or depressed. Musculoskeletal:    Denies  myalgias, joint complaints or back pain.

## 2023-11-30 NOTE — PROGRESS NOTES
4 Eyes Skin Assessment     NAME:  Paulina Kaur  YOB: 1950  MEDICAL RECORD NUMBER:  [id number]    The patient is being assessed for  Admission    I agree that at least one RN has performed a thorough Head to Toe Skin Assessment on the patient. ALL assessment sites listed below have been assessed. Areas assessed by both nurses:    Head, Face, Ears, Shoulders, Back, Chest, Arms, Elbows, Hands, Sacrum. Buttock, Coccyx, Ischium, and Legs. Feet and Heels        Does the Patient have a Wound?  No noted wound(s)       Lucas Prevention initiated by RN: No  Wound Care Orders initiated by RN: No    Pressure Injury (Stage 3,4, Unstageable, DTI, NWPT, and Complex wounds) if present, place Wound referral order by RN under : No    New Ostomies, if present place, Ostomy referral order under : No     Nurse 1 eSignature: Electronically signed by Dayna Krueger RN on 11/30/23 at 10:34 AM EST    **SHARE this note so that the co-signing nurse can place an eSignature**    Nurse 2 eSignature: {Esignature:[id number]}

## 2023-11-30 NOTE — ED PROVIDER NOTES
ED PROVIDER NOTE    Chief Complaint   Patient presents with    Chest Pain     Was visiting a family member on the 4th floor and developed chest pain, brought in by nurse on Telemetry floor    Positive For Covid-19       HPI:  11/29/23,   Time: 7:27 PM PRITI Carrasco is a 68 y.o. male presenting to the ED for chest pain and dizziness. Patient was visiting his wife who is admitted on an inpatient floor when he developed lightheadedness and chest pain. Chest pain is since resolved. Still feeling lightheaded. He states that he may have taken his blood pressure medications incorrectly or thinks that it may also be related to not having eaten when he took his blood pressure medication. He has had recent COVID-19 infection. Currently no associated fever, chills, cough, shortness of breath, abdominal pain. No vomiting or diarrhea. No black or bloody stools.     Chart review: hx of HFrEF, bipolar disorder, HTN, HLD, colon ca  Lab Results   Component Value Date    LVEF 24 02/10/2022       Review of Systems:     Review of Systems  Pertinent positives and negatives as stated in HPI     --------------------------------------------- PAST HISTORY ---------------------------------------------  Past Medical History:   Past Medical History:   Diagnosis Date    Arthritis     Back pain     Bipolar disorder ([address])     Cancer ([address])     nose    Cataract, left eye     Cataract, right eye     CHF (congestive heart failure) ([address]) 10/13/2014    EF 40%    Colon cancer ([address])     Depression     Herniated lumbar disc without myelopathy     Hyperlipidemia     Hypertension     Sleep apnea        Past Surgical History:   Past Surgical History:   Procedure Laterality Date    CARDIAC CATHETERIZATION  10/20/2014    Dr Chandan Avina TEST N/A 02/10/2022    Lexiscan stress test    COLECTOMY  2013 Laparoscopic/[facility]    COLONOSCOPY  12/27/2012    ESOPHAGEAL DILATATION  2012    EYE SURGERY Right 7/26/2023 wheezing or rales. Abdominal:      General: There is no distension. Palpations: Abdomen is soft. Tenderness: There is no abdominal tenderness. There is no guarding or rebound. Musculoskeletal:         General: No swelling or tenderness. Normal range of motion. Cervical back: Normal range of motion and neck supple. No rigidity. No muscular tenderness. Comments: Radial, DP, and PT pulses 1+ bilaterally. Skin:     General: Skin is warm and dry. Neurological:      Mental Status: He is alert and oriented to person, place, and time. Comments: Somnolent, opens eyes to voice. Follows commands. Moves all extremities with appropriate strength. Sensation grossly intact in all extremities. -------------------------------------------------- RESULTS -------------------------------------------------  I have personally reviewed all laboratory and imaging results for this patient. Results are listed below. LABS:  Labs Reviewed   CBC WITH AUTO DIFFERENTIAL - Abnormal; Notable for the following components:       Result Value    Eosinophils Absolute 0.04 (*)     All other components within normal limits   COMPREHENSIVE METABOLIC PANEL - Abnormal; Notable for the following components:    Glucose 178 (*)     BUN 24 (*)     Creatinine 1.6 (*)     Est, Glom Filt Rate 47 (*)     All other components within normal limits   TROPONIN - Abnormal; Notable for the following components:    Troponin, High Sensitivity 22 (*)     All other components within normal limits   MAGNESIUM   LACTIC ACID   BRAIN NATRIURETIC PEPTIDE   TROPONIN       RADIOLOGY:  Interpreted personally and by Radiologist.  XR CHEST PORTABLE   Final Result   No acute process. CTA PULMONARY W CONTRAST    (Results Pending)       EKG: This EKG is signed and interpreted by the EP.     Sinus bradycardia, ventricular rate 57 bpm, normal axis and intervals, ST depression with T wave inversion in lateral leads which appears new

## 2023-11-30 NOTE — H&P
Department of Internal Medicine  History and Physical    PCP: Coco Stockton DO  Admitting Physician: Dr. Darlene Stiles  Consultants:             Date of Service: 11/29/2023    CHIEF COMPLAINT:  chest pain    HISTORY OF PRESENT ILLNESS:    Patient is a 80-year-old male who presents to the ED due to chest pain and lightheadedness and dizziness. Patient was up on the floors seeing his wife when he began feeling symptoms. States that he had lightheadedness and dizziness. He also developed chest discomfort which he described as a heaviness which lasted more than 30 minutes. States that due to his wife being sick he has been following up with her and taking care of her. He did just take his evening meds at 5 PM prior to coming  to the hospital.  States he has also contracted COVID-19. As such he has diminished appetite and has not been eating or drinking much. However he has been taking his medication although not as consistently as usual.  Additionally he complains of inadequate sleep.     PAST MEDICAL Hx:  Past Medical History:   Diagnosis Date    Arthritis     Back pain     Bipolar disorder ([address])     Cancer ([address])     nose    Cataract, left eye     Cataract, right eye     CHF (congestive heart failure) ([address]) 10/13/2014    EF 40%    Colon cancer (HCC)     Depression     Herniated lumbar disc without myelopathy     Hyperlipidemia     Hypertension     Sleep apnea        PAST SURGICAL Hx:   Past Surgical History:   Procedure Laterality Date    CARDIAC CATHETERIZATION  10/20/2014    Dr Beaulieu Abo TEST N/A 02/10/2022    Lexiscan stress test    COLECTOMY  2013 Laparoscopic/[facility]    COLONOSCOPY  12/27/2012    ESOPHAGEAL DILATATION  2012    EYE SURGERY Right 7/26/2023    RIGHT EYE CATARACT EXTRACTION IOL IMPLANT performed by Divya Gomez MD at [address] [facility] Left 8/23/2023    LEFT EYE CATARACT EXTRACTION IOL IMPLANT performed by Divya Gomez MD at [address] [facility]

## 2023-12-01 PROBLEM — U07.1 COVID-19: Status: ACTIVE | Noted: 2023-12-01

## 2023-12-01 LAB
ALBUMIN SERPL-MCNC: 4.1 G/DL (ref 3.5–5.2)
ALP SERPL-CCNC: 42 U/L (ref 40–129)
ALT SERPL-CCNC: 12 U/L (ref 0–40)
ANION GAP SERPL CALCULATED.3IONS-SCNC: 9 MMOL/L (ref 7–16)
AST SERPL-CCNC: 15 U/L (ref 0–39)
BASOPHILS # BLD: 0.02 K/UL (ref 0–0.2)
BASOPHILS NFR BLD: 0 % (ref 0–2)
BILIRUB SERPL-MCNC: 0.6 MG/DL (ref 0–1.2)
BUN SERPL-MCNC: 22 MG/DL (ref 6–23)
CALCIUM SERPL-MCNC: 9.2 MG/DL (ref 8.6–10.2)
CHLORIDE SERPL-SCNC: 101 MMOL/L (ref 98–107)
CO2 SERPL-SCNC: 28 MMOL/L (ref 22–29)
CREAT SERPL-MCNC: 1.1 MG/DL (ref 0.7–1.2)
EOSINOPHIL # BLD: 0.03 K/UL (ref 0.05–0.5)
EOSINOPHILS RELATIVE PERCENT: 0 % (ref 0–6)
ERYTHROCYTE [DISTWIDTH] IN BLOOD BY AUTOMATED COUNT: 12.4 % (ref 11.5–15)
GFR SERPL CREATININE-BSD FRML MDRD: >60 ML/MIN/1.73M2
GLUCOSE SERPL-MCNC: 113 MG/DL (ref 74–99)
HCT VFR BLD AUTO: 37.3 % (ref 37–54)
HGB BLD-MCNC: 12.6 G/DL (ref 12.5–16.5)
IMM GRANULOCYTES # BLD AUTO: 0.04 K/UL (ref 0–0.58)
IMM GRANULOCYTES NFR BLD: 1 % (ref 0–5)
LYMPHOCYTES NFR BLD: 1.68 K/UL (ref 1.5–4)
LYMPHOCYTES RELATIVE PERCENT: 24 % (ref 20–42)
MCH RBC QN AUTO: 30.1 PG (ref 26–35)
MCHC RBC AUTO-ENTMCNC: 33.8 G/DL (ref 32–34.5)
MCV RBC AUTO: 89 FL (ref 80–99.9)
MONOCYTES NFR BLD: 0.75 K/UL (ref 0.1–0.95)
MONOCYTES NFR BLD: 11 % (ref 2–12)
NEUTROPHILS NFR BLD: 64 % (ref 43–80)
NEUTS SEG NFR BLD: 4.56 K/UL (ref 1.8–7.3)
PLATELET # BLD AUTO: 204 K/UL (ref 130–450)
PMV BLD AUTO: 9.8 FL (ref 7–12)
POTASSIUM SERPL-SCNC: 3.8 MMOL/L (ref 3.5–5)
PROT SERPL-MCNC: 6.5 G/DL (ref 6.4–8.3)
RBC # BLD AUTO: 4.19 M/UL (ref 3.8–5.8)
SODIUM SERPL-SCNC: 138 MMOL/L (ref 132–146)
WBC OTHER # BLD: 7.1 K/UL (ref 4.5–11.5)

## 2023-12-01 PROCEDURE — 6360000002 HC RX W HCPCS: Performed by: INTERNAL MEDICINE

## 2023-12-01 PROCEDURE — 6370000000 HC RX 637 (ALT 250 FOR IP)

## 2023-12-01 PROCEDURE — 6370000000 HC RX 637 (ALT 250 FOR IP): Performed by: INTERNAL MEDICINE

## 2023-12-01 PROCEDURE — 2580000003 HC RX 258: Performed by: INTERNAL MEDICINE

## 2023-12-01 PROCEDURE — 85025 COMPLETE CBC W/AUTO DIFF WBC: CPT

## 2023-12-01 PROCEDURE — 80053 COMPREHEN METABOLIC PANEL: CPT

## 2023-12-01 PROCEDURE — 36415 COLL VENOUS BLD VENIPUNCTURE: CPT

## 2023-12-01 PROCEDURE — G0378 HOSPITAL OBSERVATION PER HR: HCPCS

## 2023-12-01 RX ADMIN — DOXAZOSIN 8 MG: 2 TABLET ORAL at 09:53

## 2023-12-01 RX ADMIN — SACUBITRIL AND VALSARTAN 2 TABLET: 24; 26 TABLET, FILM COATED ORAL at 21:18

## 2023-12-01 RX ADMIN — ASPIRIN 81 MG: 81 TABLET, COATED ORAL at 09:52

## 2023-12-01 RX ADMIN — DEXAMETHASONE 6 MG: 6 TABLET ORAL at 09:52

## 2023-12-01 RX ADMIN — Medication 10 ML: at 21:17

## 2023-12-01 RX ADMIN — Medication 10 ML: at 08:13

## 2023-12-01 RX ADMIN — SACUBITRIL AND VALSARTAN 1 TABLET: 49; 51 TABLET, FILM COATED ORAL at 21:18

## 2023-12-01 RX ADMIN — CARVEDILOL 12.5 MG: 6.25 TABLET, FILM COATED ORAL at 21:18

## 2023-12-01 RX ADMIN — PANTOPRAZOLE SODIUM 40 MG: 40 TABLET, DELAYED RELEASE ORAL at 05:33

## 2023-12-01 RX ADMIN — Medication 10 ML: at 09:52

## 2023-12-01 RX ADMIN — CARVEDILOL 12.5 MG: 6.25 TABLET, FILM COATED ORAL at 08:12

## 2023-12-01 RX ADMIN — SACUBITRIL AND VALSARTAN 2 TABLET: 24; 26 TABLET, FILM COATED ORAL at 09:52

## 2023-12-01 RX ADMIN — SACUBITRIL AND VALSARTAN 1 TABLET: 49; 51 TABLET, FILM COATED ORAL at 09:52

## 2023-12-01 ASSESSMENT — PAIN SCALES - GENERAL: PAINLEVEL_OUTOF10: 0

## 2023-12-01 NOTE — PLAN OF CARE
Problem: Discharge Planning  Goal: Discharge to home or other facility with appropriate resources  12/1/2023 1017 by Марина Delarosa RN  Outcome: Progressing  12/1/2023 0219 by Viridiana Gant RN  Outcome: Progressing     Problem: Safety - Adult  Goal: Free from fall injury  12/1/2023 1017 by Марина Delarosa RN  Outcome: Progressing  12/1/2023 0219 by Viridiana Gant RN  Outcome: Progressing     Problem: Pain  Goal: Verbalizes/displays adequate comfort level or baseline comfort level  12/1/2023 1017 by Марина Delarosa RN  Outcome: Progressing  Flowsheets (Taken 12/1/2023 1740)  Verbalizes/displays adequate comfort level or baseline comfort level:   Encourage patient to monitor pain and request assistance   Assess pain using appropriate pain scale   Administer analgesics based on type and severity of pain and evaluate response   Implement non-pharmacological measures as appropriate and evaluate response   Notify Licensed Independent Practitioner if interventions unsuccessful or patient reports new pain  12/1/2023 0219 by Viridiana Gant RN  Outcome: Progressing     Problem: Cardiovascular - Adult  Goal: Maintains optimal cardiac output and hemodynamic stability  12/1/2023 1017 by Марина Delarosa RN  Outcome: Progressing  12/1/2023 0220 by Viridiana Gant RN  Outcome: Progressing

## 2023-12-01 NOTE — PLAN OF CARE
Problem: Discharge Planning  Goal: Discharge to home or other facility with appropriate resources  12/1/2023 0219 by Qiana Hope RN  Outcome: Progressing  11/30/2023 1836 by Tee Tomlinson RN  Outcome: Progressing     Problem: Safety - Adult  Goal: Free from fall injury  12/1/2023 0219 by Qiana Hope RN  Outcome: Progressing  11/30/2023 1836 by Tee Tomlinson RN  Outcome: Progressing     Problem: Pain  Goal: Verbalizes/displays adequate comfort level or baseline comfort level  12/1/2023 0219 by Qiana Hope RN  Outcome: Progressing  11/30/2023 1836 by Tee Tomlinson RN  Outcome: Progressing

## 2023-12-01 NOTE — PROGRESS NOTES
Internal Medicine Progress Note    [facility]    Lorraine Espinal. Louisa Cummings., JESIODILLON Arceo D.O., BRYN Galvez, MSN, APRN, NPYOANA David, MSN, APRN-CNP  Beatriz Vaca, MSN, APRN-CNP     Primary Care Physician: Kezia Richard DO   Admitting Physician:  Jonah Sheppard DO  Admission date and time: 11/29/2023  7:12 PM    Room:  [address] [place] [address]  Admitting diagnosis: Chest pain [R07.9]  Hypotension, unspecified hypotension type [I95.9]  Chest pain, unspecified type [R07.9]    Patient Name: Anastacio Tabor  MRN: [id number]    Date of Service: 12/1/2023     Subjective:  Elo Merritt is a 68 y.o. male who was seen and examined today,12/1/2023, at the bedside. Patient sitting up in bed he states he is breathing easier today he is currently on room air with no shortness of breath. He is worried about his wife who is a patient on on the floor in the hospital and wishes to be roomed with her. Nursing supervisor notified of patient's wishes. if he continues to show progress to be able to discharge tomorrow    No family present during my examination. Review of System:   Constitutional:   Denies fever or chills, weight loss or gain. Complains of fatigue  HEENT:   Denies ear pain, sore throat, sinus or eye problems. Cardiovascular:   Denies any chest pain, irregular heartbeats, or palpitations. Respiratory:   Improved shortness of breath  Gastrointestinal:   Denies nausea, vomiting, diarrhea, or constipation. Denies any abdominal pain. Genitourinary:    Denies any urgency, frequency, hematuria. Voiding  without difficulty. Extremities:   Denies lower extremity swelling, edema or cyanosis. Neurology:    Denies any headache or focal neurological deficits, Denies generalized weakness or memory difficulty. Psch:   Denies being anxious or depressed.   Musculoskeletal:    Denies  myalgias, joint complaints 50 mg Oral Daily    [Held by provider] hydroCHLOROthiazide  25 mg Oral Daily    dexamethasone  6 mg Oral Daily    sacubitril-valsartan  2 tablet Oral BID    And    sacubitril-valsartan  1 tablet Oral BID    sodium chloride flush  5-40 mL IntraVENous 2 times per day    aspirin  81 mg Oral Daily    carvedilol  12.5 mg Oral BID WC    [Held by provider] hydrALAZINE  50 mg Oral BID    doxazosin  8 mg Oral Daily    pantoprazole  40 mg Oral QAM AC    enoxaparin  40 mg SubCUTAneous Daily     Continuous Infusions:   dextrose      sodium chloride         Objective Data:  Recent Labs     11/29/23 1930 11/30/23  0536 12/01/23  0529   WBC 6.3 6.5 7.1   RBC 4.53 4.07 4.19   HGB 13.6 12.1* 12.6   HCT 39.9 36.5* 37.3   MCV 88.1 89.7 89.0   MCH 30.0 29.7 30.1   MCHC 34.1 33.2 33.8   RDW 12.6 12.7 12.4    197 204   MPV 9.8 9.9 9.8       Recent Labs     11/29/23 1930 11/30/23 0536 12/01/23  0529    140 138   K 4.5 3.7 3.8    103 101   CO2 27 28 28   BUN 24* 23 22   CREATININE 1.6* 1.3* 1.1   GLUCOSE 178* 93 113*   CALCIUM 9.5 9.0 9.2   PROT 7.0 6.3* 6.5   LABALBU 4.1 3.7 4.1   BILITOT 0.8 0.7 0.6   ALKPHOS 46 40 42   AST 23 18 15   ALT 14 12 12       Lab Results   Component Value Date    TROPONINI <0.01 11/13/2016    TROPONINI <0.01 10/17/2014    TROPONINI <0.01 10/17/2014          Wound Documentation:   Incision 09/20/17 Abdomen (Active)   Number of days: 2262       Assessment:    Chest pain probably anterior chest wall syndrome  Acute COVID-19 pneumonia  Acute kidney injury secondary dehydration  Spiculated right middle lobe nodule present since 2020 without change 1.5 cm low attenuated lesion right lobe of the liver   Chronic systolic congestive heart failure  Sleep apnea  History of colon cancer  Hyperlipidemia  Hypertension  Bipolar disorder      Plan:     Treatment for underlying COVID  Stop IV fluids  Treat chronic comorbidities  History of colon cancer patient states he would not do any further

## 2023-12-01 NOTE — PROGRESS NOTES
Patient ambulated with this nurse to [phone] to visit his wife after taking a shower per order. HR 60's-70's and BP remains Hypertensive. No dyspnea on exertion noted. Patient will have charge nurse, Judy Miguel call when patient wishes to return to his room. Judy Miguel is aware.

## 2023-12-01 NOTE — PLAN OF CARE
Problem: Discharge Planning  Goal: Discharge to home or other facility with appropriate resources  12/1/2023 0219 by Sarah Phan RN  Outcome: Progressing  11/30/2023 1836 by Blaine Davis RN  Outcome: Progressing     Problem: Safety - Adult  Goal: Free from fall injury  12/1/2023 0219 by Sarah Phan RN  Outcome: Progressing  11/30/2023 1836 by Blaine Davis RN  Outcome: Progressing     Problem: Pain  Goal: Verbalizes/displays adequate comfort level or baseline comfort level  12/1/2023 0219 by Sarah Phan RN  Outcome: Progressing  11/30/2023 1836 by Blaine Davis RN  Outcome: Progressing     Problem: Cardiovascular - Adult  Goal: Maintains optimal cardiac output and hemodynamic stability  Outcome: Progressing

## 2023-12-02 VITALS
TEMPERATURE: 97.6 F | HEIGHT: 70 IN | DIASTOLIC BLOOD PRESSURE: 84 MMHG | SYSTOLIC BLOOD PRESSURE: 175 MMHG | RESPIRATION RATE: 16 BRPM | BODY MASS INDEX: 30.46 KG/M2 | WEIGHT: 212.74 LBS | HEART RATE: 54 BPM | OXYGEN SATURATION: 95 %

## 2023-12-02 LAB
ALBUMIN SERPL-MCNC: 4.2 G/DL (ref 3.5–5.2)
ALP SERPL-CCNC: 44 U/L (ref 40–129)
ALT SERPL-CCNC: 12 U/L (ref 0–40)
ANION GAP SERPL CALCULATED.3IONS-SCNC: 8 MMOL/L (ref 7–16)
AST SERPL-CCNC: 13 U/L (ref 0–39)
BASOPHILS # BLD: 0.02 K/UL (ref 0–0.2)
BASOPHILS NFR BLD: 0 % (ref 0–2)
BILIRUB SERPL-MCNC: 0.5 MG/DL (ref 0–1.2)
BUN SERPL-MCNC: 18 MG/DL (ref 6–23)
CALCIUM SERPL-MCNC: 9.3 MG/DL (ref 8.6–10.2)
CHLORIDE SERPL-SCNC: 104 MMOL/L (ref 98–107)
CO2 SERPL-SCNC: 27 MMOL/L (ref 22–29)
CREAT SERPL-MCNC: 0.9 MG/DL (ref 0.7–1.2)
EKG ATRIAL RATE: 57 BPM
EKG P AXIS: 57 DEGREES
EKG P-R INTERVAL: 192 MS
EKG Q-T INTERVAL: 442 MS
EKG QRS DURATION: 112 MS
EKG QTC CALCULATION (BAZETT): 430 MS
EKG R AXIS: -13 DEGREES
EKG T AXIS: 115 DEGREES
EKG VENTRICULAR RATE: 57 BPM
EOSINOPHIL # BLD: 0.01 K/UL (ref 0.05–0.5)
EOSINOPHILS RELATIVE PERCENT: 0 % (ref 0–6)
ERYTHROCYTE [DISTWIDTH] IN BLOOD BY AUTOMATED COUNT: 12.4 % (ref 11.5–15)
GFR SERPL CREATININE-BSD FRML MDRD: >60 ML/MIN/1.73M2
GLUCOSE SERPL-MCNC: 113 MG/DL (ref 74–99)
HCT VFR BLD AUTO: 38.9 % (ref 37–54)
HGB BLD-MCNC: 13.1 G/DL (ref 12.5–16.5)
IMM GRANULOCYTES # BLD AUTO: 0.09 K/UL (ref 0–0.58)
IMM GRANULOCYTES NFR BLD: 1 % (ref 0–5)
LYMPHOCYTES NFR BLD: 1.97 K/UL (ref 1.5–4)
LYMPHOCYTES RELATIVE PERCENT: 22 % (ref 20–42)
MCH RBC QN AUTO: 29.8 PG (ref 26–35)
MCHC RBC AUTO-ENTMCNC: 33.7 G/DL (ref 32–34.5)
MCV RBC AUTO: 88.4 FL (ref 80–99.9)
MONOCYTES NFR BLD: 0.81 K/UL (ref 0.1–0.95)
MONOCYTES NFR BLD: 9 % (ref 2–12)
NEUTROPHILS NFR BLD: 68 % (ref 43–80)
NEUTS SEG NFR BLD: 6.1 K/UL (ref 1.8–7.3)
PLATELET # BLD AUTO: 207 K/UL (ref 130–450)
PMV BLD AUTO: 9.9 FL (ref 7–12)
POTASSIUM SERPL-SCNC: 4 MMOL/L (ref 3.5–5)
PROT SERPL-MCNC: 6.8 G/DL (ref 6.4–8.3)
RBC # BLD AUTO: 4.4 M/UL (ref 3.8–5.8)
SODIUM SERPL-SCNC: 139 MMOL/L (ref 132–146)
WBC OTHER # BLD: 9 K/UL (ref 4.5–11.5)

## 2023-12-02 PROCEDURE — 36415 COLL VENOUS BLD VENIPUNCTURE: CPT

## 2023-12-02 PROCEDURE — 2580000003 HC RX 258: Performed by: INTERNAL MEDICINE

## 2023-12-02 PROCEDURE — 85025 COMPLETE CBC W/AUTO DIFF WBC: CPT

## 2023-12-02 PROCEDURE — 6370000000 HC RX 637 (ALT 250 FOR IP): Performed by: INTERNAL MEDICINE

## 2023-12-02 PROCEDURE — G0378 HOSPITAL OBSERVATION PER HR: HCPCS

## 2023-12-02 PROCEDURE — 93010 ELECTROCARDIOGRAM REPORT: CPT | Performed by: INTERNAL MEDICINE

## 2023-12-02 PROCEDURE — 80053 COMPREHEN METABOLIC PANEL: CPT

## 2023-12-02 PROCEDURE — 6370000000 HC RX 637 (ALT 250 FOR IP)

## 2023-12-02 RX ORDER — CARVEDILOL 12.5 MG/1
6.25 TABLET ORAL 2 TIMES DAILY WITH MEALS
Qty: 60 TABLET | Refills: 3 | Status: SHIPPED | OUTPATIENT
Start: 2023-12-02

## 2023-12-02 RX ORDER — DEXAMETHASONE 6 MG/1
6 TABLET ORAL
Qty: 10 TABLET | Refills: 0 | Status: SHIPPED | OUTPATIENT
Start: 2023-12-02 | End: 2023-12-12

## 2023-12-02 RX ORDER — ALBUTEROL SULFATE 90 UG/1
2 AEROSOL, METERED RESPIRATORY (INHALATION) 4 TIMES DAILY PRN
Qty: 18 G | Refills: 0 | Status: SHIPPED | OUTPATIENT
Start: 2023-12-02

## 2023-12-02 RX ORDER — BUDESONIDE AND FORMOTEROL FUMARATE DIHYDRATE 160; 4.5 UG/1; UG/1
2 AEROSOL RESPIRATORY (INHALATION) 2 TIMES DAILY
Qty: 10.2 G | Refills: 0 | Status: SHIPPED | OUTPATIENT
Start: 2023-12-02

## 2023-12-02 RX ADMIN — Medication 10 ML: at 08:53

## 2023-12-02 RX ADMIN — PANTOPRAZOLE SODIUM 40 MG: 40 TABLET, DELAYED RELEASE ORAL at 05:49

## 2023-12-02 RX ADMIN — ASPIRIN 81 MG: 81 TABLET, COATED ORAL at 08:52

## 2023-12-02 RX ADMIN — DEXAMETHASONE 6 MG: 6 TABLET ORAL at 08:52

## 2023-12-02 RX ADMIN — DOXAZOSIN 8 MG: 2 TABLET ORAL at 08:52

## 2023-12-02 RX ADMIN — SACUBITRIL AND VALSARTAN 2 TABLET: 24; 26 TABLET, FILM COATED ORAL at 08:52

## 2023-12-02 RX ADMIN — SACUBITRIL AND VALSARTAN 1 TABLET: 49; 51 TABLET, FILM COATED ORAL at 08:52

## 2023-12-02 RX ADMIN — CARVEDILOL 12.5 MG: 6.25 TABLET, FILM COATED ORAL at 08:52

## 2023-12-02 NOTE — ACP (ADVANCE CARE PLANNING)
Advance Care Planning     Advance Care Planning Activator (Inpatient)  Conversation Note      Date of ACP Conversation: 12/2/2023     Conversation Conducted with: Patient with Decision Making Capacity    ACP Activator: Lesvia Lui, [address]:     Current Designated Health Care Decision Maker:     Click here to complete [address] including section of the Healthcare Decision Maker Relationship (ie \"Primary\")  Today we documented Decision Maker(s) consistent with Legal Next of Kin hierarchy. Care Preferences    Ventilation: \"If you were in your present state of health and suddenly became very ill and were unable to breathe on your own, what would your preference be about the use of a ventilator (breathing machine) if it were available to you? \"      Would the patient desire the use of ventilator (breathing machine)?: no    \"If your health worsens and it becomes clear that your chance of recovery is unlikely, what would your preference be about the use of a ventilator (breathing machine) if it were available to you? \"     Would the patient desire the use of ventilator (breathing machine)?: yes       Resuscitation  \"CPR works best to restart the heart when there is a sudden event, like a heart attack, in someone who is otherwise healthy. Unfortunately, CPR does not typically restart the heart for people who have serious health conditions or who are very sick. \"    \"In the event your heart stopped as a result of an underlying serious health condition, would you want attempts to be made to restart your heart (answer \"yes\" for attempt to resuscitate) or would you prefer a natural death (answer \"no\" for do not attempt to resuscitate)? \" yes       [] Yes   [] No   Educated Patient / Tyra Dopp regarding differences between Advance Directives and portable DNR orders.     Length of ACP Conversation in minutes:      Conversation Outcomes:  ACP discussion completed    Follow-up plan:

## 2023-12-02 NOTE — CARE COORDINATION
Case Management Assessment  Initial Evaluation    Date/Time of Evaluation: 12/2/2023 10:58 AM  Assessment Completed by: SHANELL Sloan    If patient is discharged prior to next notation, then this note serves as note for discharge by case management. Patient Name: Idalia Guzman                   YOB: 1950  Diagnosis: Chest pain [R07.9]  Hypotension, unspecified hypotension type [I95.9]  Chest pain, unspecified type [R07.9]                   Date / Time: 11/29/2023  7:12 PM    Patient Admission Status: Observation   Readmission Risk (Low < 19, Mod (19-27), High > 27): No data recorded  Current PCP: Patti Bumpers, DO  PCP verified by CM? Yes    Chart Reviewed: Yes      History Provided by: Patient  Patient Orientation: Alert and Oriented    Patient Cognition: Alert    Hospitalization in the last 30 days (Readmission):  No    If yes, Readmission Assessment in CM Navigator will be completed. Advance Directives:      Code Status: Full Code   Patient's Primary Decision Maker is: Legal Next of Kin      Discharge Planning:    Patient lives with: Spouse/Significant Other Type of Home: House  Primary Care Giver: Self  Patient Support Systems include: Spouse/Significant Other   Current Financial resources:    Current community resources:    Current services prior to admission: None            Current DME:              Type of Home Care services:  None    ADLS  Prior functional level: Independent in ADLs/IADLs  Current functional level: Independent in ADLs/IADLs    PT AM-PAC:   /24  OT AM-PAC:   /24    Family can provide assistance at DC: No  Would you like Case Management to discuss the discharge plan with any other family members/significant others, and if so, who?  No  Plans to Return to Present Housing: Yes  Other Identified Issues/Barriers to RETURNING to current housing: none noted   Potential Assistance needed at discharge: N/A            Potential DME:    Patient expects to discharge to: House  Plan for transportation at discharge:      Financial    Payor: MEDICARE / Plan: MEDICARE PART A AND B / Product Type: *No Product type* /     Does insurance require precert for SNF: No    Potential assistance Purchasing Medications:    Meds-to-Beds request: Yes      [id number], [address]  [address] [place]  Phone: [phone] Fax: [phone]      Notes:    Factors facilitating achievement of predicted outcomes: Motivated, Cooperative, and Pleasant    Barriers to discharge: none noted     Additional Case Management Notes: Kaylene met with pt along with RN, Raji Rowe in hallway yesterday afternoon. Pt ambulating without difficulty or device, room air. He was going to see his spouse who is admitted on Tele unit. He did wish to talk about spouse and his thought that she may be displaying the beginning stages of dementia. He is interested in testing which Sw did say he should discuss with PCP, Dr Lyn Saldana. Pt has no dc needs, he drove self to hospital and plans to drive self home.            SHANELL Gibson  Case Management Department  Ph:  Fax:

## 2023-12-02 NOTE — PLAN OF CARE
Problem: Discharge Planning  Goal: Discharge to home or other facility with appropriate resources  Outcome: Progressing     Problem: Safety - Adult  Goal: Free from fall injury  Outcome: Progressing     Problem: Pain  Goal: Verbalizes/displays adequate comfort level or baseline comfort level  Outcome: Progressing  Flowsheets  Taken 12/1/2023 1452 by Molly Connors RN  Verbalizes/displays adequate comfort level or baseline comfort level:   Encourage patient to monitor pain and request assistance   Assess pain using appropriate pain scale   Administer analgesics based on type and severity of pain and evaluate response   Implement non-pharmacological measures as appropriate and evaluate response   Notify Licensed Independent Practitioner if interventions unsuccessful or patient reports new pain  Taken 12/1/2023 1205 by Molly Connors RN  Verbalizes/displays adequate comfort level or baseline comfort level:   Encourage patient to monitor pain and request assistance   Assess pain using appropriate pain scale   Administer analgesics based on type and severity of pain and evaluate response   Implement non-pharmacological measures as appropriate and evaluate response   Notify Licensed Independent Practitioner if interventions unsuccessful or patient reports new pain     Problem: Cardiovascular - Adult  Goal: Maintains optimal cardiac output and hemodynamic stability  Outcome: Progressing

## 2023-12-02 NOTE — DISCHARGE SUMMARY
cortical cysts without complex features or internal flow. PANCREAS:  Visualized portions of the pancreas are unremarkable. OTHER: No evidence of right upper quadrant ascites. Unremarkable right upper quadrant ultrasound. CTA PULMONARY W CONTRAST    Result Date: 11/29/2023  EXAMINATION: CTA OF THE CHEST 11/29/2023 10:43 pm TECHNIQUE: CTA of the chest was performed after the administration of intravenous contrast.  Multiplanar reformatted images are provided for review. MIP images are provided for review. Automated exposure control, iterative reconstruction, and/or weight based adjustment of the mA/kV was utilized to reduce the radiation dose to as low as reasonably achievable. COMPARISON: None. HISTORY: ORDERING SYSTEM PROVIDED HISTORY: chest pain, hypotension, recent covid, r/o PE TECHNOLOGIST PROVIDED HISTORY: Reason for exam:->chest pain, hypotension, recent covid, r/o PE Decision Support Exception - unselect if not a suspected or confirmed emergency medical condition->Emergency Medical Condition (MA) FINDINGS: Pulmonary Arteries: Pulmonary arteries are adequately opacified for evaluation. No evidence of intraluminal filling defect to suggest pulmonary embolism. Main pulmonary artery is normal in caliber. Mediastinum: Mediastinal lymphadenopathy. The heart and pericardium demonstrate no acute abnormality. There is no acute abnormality of the thoracic aorta. Lungs/pleura: The lungs are without acute process. No focal consolidation or pulmonary edema. No evidence of pleural effusion or pneumothorax. 1.8 cm x 1.0 cm oval spiculated right middle lobe nodule. Upper Abdomen: The partially visualized 1.5 cm low-attenuation lesion right lobe of the liver. Soft Tissues/Bones: No acute bone or soft tissue abnormality. Degenerative changes thoracic spine. No evidence of pulmonary embolism or acute pulmonary abnormality. 1.8 cm x 1.0 cm oval spiculated right middle lobe nodule.   This was present on prior the patient is without objective evidence of an acute process requiring continuing hospitalization or inpatient management. They are stable for discharge with outpatient follow-up. I have spoken with the patient and discussed the results of the current hospitalization, in addition to providing specific details for the plan of care and counseling regarding the diagnosis and prognosis. The plan has been discussed in detail and they are aware of the specific conditions for emergent return, as well as the importance of follow-up. Their questions are answered at this time and they are agreeable with the plan for discharge to home    DISCHARGE MEDICATIONS:   Current Discharge Medication List             Details   dexamethasone (DECADRON) 6 MG tablet Take 1 tablet by mouth daily (with breakfast) for 10 days  Qty: 10 tablet, Refills: 0      albuterol sulfate HFA (VENTOLIN HFA) 108 (90 Base) MCG/ACT inhaler Inhale 2 puffs into the lungs 4 times daily as needed for Wheezing  Qty: 18 g, Refills: 0      budesonide-formoterol (SYMBICORT) 160-4.5 MCG/ACT AERO Inhale 2 puffs into the lungs 2 times daily . Rinse mouth and spit after each use. Qty: 10.2 g, Refills: 0    Comments: May substitute covered alternative or components.                 Details   carvedilol (COREG) 12.5 MG tablet Take 0.5 tablets by mouth 2 times daily (with meals)  Qty: 60 tablet, Refills: 3                Details   sacubitril-valsartan (ENTRESTO)  MG per tablet Take 1 tablet by mouth 2 times daily      aspirin 81 MG EC tablet Take 1 tablet by mouth daily      omeprazole (PRILOSEC) 20 MG delayed release capsule Take 1 capsule by mouth every morning      hydrALAZINE (APRESOLINE) 50 MG tablet Take 1 tablet by mouth 2 times daily      hydroCHLOROthiazide (HYDRODIURIL) 25 MG tablet Take 1 tablet by mouth daily      eplerenone (INSPRA) 50 MG tablet Take 1 tablet by mouth daily      terazosin (HYTRIN) 5 MG capsule Take 2 capsules by mouth nightly

## 2023-12-13 ENCOUNTER — HOSPITAL ENCOUNTER (OUTPATIENT)
Dept: CARDIOLOGY | Facility: HOSPITAL | Age: 73
Discharge: HOME | End: 2023-12-13
Payer: MEDICARE

## 2023-12-13 DIAGNOSIS — I50.22 CHRONIC SYSTOLIC HEART FAILURE (MULTI): ICD-10-CM

## 2023-12-13 LAB
AORTIC VALVE MEAN GRADIENT: 5.2
AORTIC VALVE PEAK VELOCITY: 1.47
AV PEAK GRADIENT: 8.6
AVA (PEAK VEL): 2.63
AVA (VTI): 2.72
EJECTION FRACTION APICAL 4 CHAMBER: 35.1
EJECTION FRACTION: 40
LEFT ATRIUM VOLUME AREA LENGTH INDEX BSA: 24.4
LEFT VENTRICLE INTERNAL DIMENSION DIASTOLE: 5.82 (ref 3.5–6)
LEFT VENTRICULAR OUTFLOW TRACT DIAMETER: 2.03
MITRAL VALVE E/A RATIO: 0.59
MITRAL VALVE E/E' RATIO: 7.34
RIGHT VENTRICLE FREE WALL PEAK S': 18.14
RIGHT VENTRICLE PEAK SYSTOLIC PRESSURE: 29.7
TRICUSPID ANNULAR PLANE SYSTOLIC EXCURSION: 2.8

## 2023-12-13 PROCEDURE — 93306 TTE W/DOPPLER COMPLETE: CPT | Performed by: INTERNAL MEDICINE

## 2023-12-13 PROCEDURE — 2500000004 HC RX 250 GENERAL PHARMACY W/ HCPCS (ALT 636 FOR OP/ED): Performed by: INTERNAL MEDICINE

## 2023-12-13 RX ADMIN — HUMAN ALBUMIN MICROSPHERES AND PERFLUTREN 0.5 ML: 10; .22 INJECTION, SOLUTION INTRAVENOUS at 14:58

## 2024-02-23 ENCOUNTER — OFFICE VISIT (OUTPATIENT)
Dept: CARDIOLOGY | Facility: HOSPITAL | Age: 74
End: 2024-02-23
Payer: MEDICARE

## 2024-02-23 VITALS
SYSTOLIC BLOOD PRESSURE: 148 MMHG | DIASTOLIC BLOOD PRESSURE: 82 MMHG | BODY MASS INDEX: 32.94 KG/M2 | HEART RATE: 68 BPM | WEIGHT: 229.6 LBS | OXYGEN SATURATION: 92 % | RESPIRATION RATE: 20 BRPM

## 2024-02-23 DIAGNOSIS — I10 HYPERTENSION, UNSPECIFIED TYPE: ICD-10-CM

## 2024-02-23 DIAGNOSIS — I50.22 CHRONIC SYSTOLIC HEART FAILURE (MULTI): Primary | ICD-10-CM

## 2024-02-23 PROCEDURE — 99213 OFFICE O/P EST LOW 20 MIN: CPT | Performed by: NURSE PRACTITIONER

## 2024-02-23 PROCEDURE — 3079F DIAST BP 80-89 MM HG: CPT | Performed by: NURSE PRACTITIONER

## 2024-02-23 PROCEDURE — 1036F TOBACCO NON-USER: CPT | Performed by: NURSE PRACTITIONER

## 2024-02-23 PROCEDURE — 1126F AMNT PAIN NOTED NONE PRSNT: CPT | Performed by: NURSE PRACTITIONER

## 2024-02-23 PROCEDURE — 3077F SYST BP >= 140 MM HG: CPT | Performed by: NURSE PRACTITIONER

## 2024-02-23 PROCEDURE — 1159F MED LIST DOCD IN RCRD: CPT | Performed by: NURSE PRACTITIONER

## 2024-02-23 PROCEDURE — 99213 OFFICE O/P EST LOW 20 MIN: CPT | Mod: ZK | Performed by: NURSE PRACTITIONER

## 2024-02-23 PROCEDURE — 1160F RVW MEDS BY RX/DR IN RCRD: CPT | Performed by: NURSE PRACTITIONER

## 2024-02-23 RX ORDER — HYDRALAZINE HYDROCHLORIDE 50 MG/1
50 TABLET, FILM COATED ORAL 2 TIMES DAILY
Qty: 180 TABLET | Refills: 3 | Status: SHIPPED | OUTPATIENT
Start: 2024-02-23 | End: 2024-04-05 | Stop reason: SDUPTHER

## 2024-02-23 RX ORDER — ASPIRIN 81 MG/1
81 TABLET ORAL DAILY
COMMUNITY

## 2024-02-23 RX ORDER — HYDROCHLOROTHIAZIDE 25 MG/1
25 TABLET ORAL DAILY
Qty: 90 TABLET | Refills: 1 | Status: SHIPPED | OUTPATIENT
Start: 2024-02-23 | End: 2024-04-05 | Stop reason: SDUPTHER

## 2024-02-23 ASSESSMENT — ENCOUNTER SYMPTOMS
OCCASIONAL FEELINGS OF UNSTEADINESS: 0
HEMATURIA: 0
PALPITATIONS: 0
SHORTNESS OF BREATH: 0
LOSS OF SENSATION IN FEET: 0
CONFUSION: 0
CHILLS: 0
COUGH: 0
WEAKNESS: 0
LIGHT-HEADEDNESS: 0
ABDOMINAL DISTENTION: 0
CHEST TIGHTNESS: 0
DEPRESSION: 0
EYES NEGATIVE: 1
BLOOD IN STOOL: 0
WHEEZING: 0
FEVER: 0
ACTIVITY CHANGE: 0

## 2024-02-23 ASSESSMENT — COLUMBIA-SUICIDE SEVERITY RATING SCALE - C-SSRS
1. IN THE PAST MONTH, HAVE YOU WISHED YOU WERE DEAD OR WISHED YOU COULD GO TO SLEEP AND NOT WAKE UP?: NO
2. HAVE YOU ACTUALLY HAD ANY THOUGHTS OF KILLING YOURSELF?: NO
6. HAVE YOU EVER DONE ANYTHING, STARTED TO DO ANYTHING, OR PREPARED TO DO ANYTHING TO END YOUR LIFE?: NO

## 2024-02-23 ASSESSMENT — PATIENT HEALTH QUESTIONNAIRE - PHQ9
SUM OF ALL RESPONSES TO PHQ9 QUESTIONS 1 AND 2: 0
2. FEELING DOWN, DEPRESSED OR HOPELESS: NOT AT ALL
1. LITTLE INTEREST OR PLEASURE IN DOING THINGS: NOT AT ALL

## 2024-02-23 ASSESSMENT — PAIN SCALES - GENERAL: PAINLEVEL: 0-NO PAIN

## 2024-02-23 NOTE — PATIENT INSTRUCTIONS
Thank you for coming in today.  If you have any questions you may contact the office Monday through Friday at [phone] or on week ends at [phone].    Continue current medications.     Get lab work at the end of March.     Schedule CPET testing.     Please follow  a 2 GM sodium diet and limit fluid intake to 2 liters per day or 8 servings ( serving size = 8 oz. = 1 cup = 240 ml) per day.   Please avoid processed meat products (luncheon meats, sausages, mccrary, hot dogs for example) eat 4 servings of vegetables and 1-2 whole servings of whole fruits per day.   Please weigh daily and call [phone] for weight gain of 3 pounds in 24 hours or 5 pounds or if you experience increased swelling or shortness of breath.         Follow up:  4 months.     Please be sure to follow up with your cardiologist at [facility] once every year. Call [phone] to schedule appointment if you do not have a follow up appointment scheduled already.

## 2024-02-23 NOTE — PROGRESS NOTES
Subjective   Patient ID: Dragan Prince is a 73 y.o. male who presents for follow-up of congestive heart failure.     Current Outpatient Medications:     aspirin 81 mg EC tablet, Take 1 tablet (81 mg) by mouth once daily., Disp: , Rfl:     carvedilol (Coreg) 12.5 mg tablet, Take 0.5 tablets (6.25 mg) by mouth 2 times a day with meals., Disp: , Rfl:     eplerenone (Inspra) 50 mg tablet, Take 1 tablet (50 mg) by mouth once daily., Disp: , Rfl:     hydrALAZINE (Apresoline) 50 mg tablet, Take 1 tablet (50 mg) by mouth 2 times a day., Disp: 180 tablet, Rfl: 3    hydroCHLOROthiazide (HYDRODiuril) 25 mg tablet, Take 1 tablet (25 mg) by mouth once daily., Disp: 90 tablet, Rfl: 1    omeprazole (PriLOSEC) 20 mg DR capsule, Take 1 capsule (20 mg) by mouth once daily., Disp: , Rfl:     sacubitriL-valsartan (Entresto)  mg tablet, Take 1 tablet by mouth 2 times a day., Disp: , Rfl:      HPI   Patient is taking medications as directed.  He denies orthopnea PND or edema.  He denies chest pain or palpitations no focal neurological change dizziness near sina syncope.      Review of Systems   Constitutional:  Negative for activity change, chills and fever.   HENT:  Negative for hearing loss.    Eyes: Negative.    Respiratory:  Negative for cough, chest tightness, shortness of breath and wheezing.    Cardiovascular:  Negative for chest pain, palpitations and leg swelling.   Gastrointestinal:  Negative for abdominal distention and blood in stool.   Genitourinary:  Negative for hematuria.   Neurological:  Negative for syncope, weakness and light-headedness.   Psychiatric/Behavioral:  Negative for confusion.        Objective     /82 (BP Location: Left arm, Patient Position: Sitting)   Pulse 68   Resp 20   Wt 104 kg (229 lb 9.6 oz)   SpO2 92%   BMI 32.94 kg/m²         Transthoracic echo (TTE) complete    Result Date: 12/13/2023              [address] [address]      Phone  [phone] Fax [phone] TRANSTHORACIC ECHOCARDIOGRAM REPORT  Patient Name:      QUENTIN KIMBLE      Reading Physician:    [id number] Roderick Cade DO Study Date:        12/13/2023           Ordering Provider:    [id number] LOUIE HARLEY MRN/PID:           [id number]             Fellow: Accession#:        [id number]         Nurse:                Barb Tran RN Date of Birth/Age: 1950 / 73 years Sonographer:          Rosaura Jones RDCS Gender:            M                    Additional Staff:     Ariadna Student Height:            177.80 cm            Admit Date:           12/13/2023 Weight:            104.78 kg            Admission Status:     Inpatient -                                                               Routine BSA:               2.22 m2              Department Location:  [facility] Echo                                                               Lab Blood Pressure: 148 /87 mmHg Study Type:    TRANSTHORACIC ECHO (TTE) COMPLETE Diagnosis/ICD: Chronic systolic (congestive) heart failure (CHF)-I50.22 Indication:    Congestive Heart Failure CPT Codes:     Echo Complete w Full Doppler-[id number]  Study Detail: The following Echo studies were performed: 2D, M-Mode, Doppler and               color flow. Optison used as a contrast agent for endocardial               border definition. Total contrast used for this procedure was 3 mL               via IV push.  PHYSICIAN INTERPRETATION: Left Ventricle: Left ventricular systolic function is mildly decreased, with an estimated ejection fraction of 40-45%. There is global hypokinesis of the left ventricle with minor regional variations. The left ventricular cavity size is mildly dilated. There is moderate concentric left ventricular hypertrophy. Spectral Doppler shows an impaired relaxation pattern of left ventricular diastolic filling. Left  Atrium: The left atrium is normal in size. Right Ventricle: The right ventricle is normal in size. There is normal right ventricular global systolic function. Right Atrium: The right atrium is normal in size. Aortic Valve: The aortic valve is trileaflet. There is mild aortic valve cusp calcification. There is no evidence of aortic valve regurgitation. The peak instantaneous gradient of the aortic valve is 8.6 mmHg. The mean gradient of the aortic valve is 5.2 mmHg. Mitral Valve: The mitral valve is normal in structure. There is mild mitral annular calcification. There is trace mitral valve regurgitation. Tricuspid Valve: The tricuspid valve is structurally normal. There is trace tricuspid regurgitation. Pulmonic Valve: The pulmonic valve is structurally normal. There is no indication of pulmonic valve regurgitation. Pericardium: There is no pericardial effusion noted. Aorta: The aortic root is normal.  CONCLUSIONS:  1. Left ventricular systolic function is mildly decreased with a 40-45% estimated ejection fraction.  2. Spectral Doppler shows an impaired relaxation pattern of left ventricular diastolic filling.  3. There is moderate concentric left ventricular hypertrophy.  4. There is global hypokinesis of the left ventricle with minor regional variations. QUANTITATIVE DATA SUMMARY: 2D MEASUREMENTS:                           Normal Ranges: LAs:           4.59 cm    (2.7-4.0cm) IVSd:          1.40 cm    (0.6-1.1cm) LVPWd:         1.48 cm    (0.6-1.1cm) LVIDd:         5.82 cm    (3.9-5.9cm) LVIDs:         4.06 cm LV Mass Index: 173.4 g/m2 LV % FS        30.2 % LA VOLUME:                               Normal Ranges: LA Vol A4C:        50.2 ml    (22+/-6mL/m2) LA Vol A2C:        55.7 ml LA Vol BP:         54.2 ml LA Vol Index A4C:  22.6ml/m2 LA Vol Index A2C:  25.1 ml/m2 LA Vol Index BP:   24.4 ml/m2 LA Area A4C:       18.5 cm2 LA Area A2C:       20.0 cm2 LA Major Axis A4C: 5.8 cm LA Major Axis A2C: 6.1 cm LA Volume  Index:   24.0 ml/m2 LA Vol A4C:        49.2 ml LA Vol A2C:        53.9 ml RA VOLUME BY A/L METHOD:                       Normal Ranges: RA Area A4C: 16.3 cm2 M-MODE MEASUREMENTS:                  Normal Ranges: Ao Root: 3.20 cm (2.0-3.7cm) AORTA MEASUREMENTS:                    Normal Ranges: Asc Ao, d: 3.40 cm (2.1-3.4cm) LV SYSTOLIC FUNCTION BY 2D PLANIMETRY (MOD):                     Normal Ranges: EF-A4C View: 35.1 % (>=55%) EF-A2C View: 53.5 % EF-Biplane:  40.3 % LV DIASTOLIC FUNCTION:                           Normal Ranges: MV Peak E:    0.48 m/s    (0.7-1.2 m/s) MV Peak A:    0.81 m/s    (0.42-0.7 m/s) E/A Ratio:    0.59        (1.0-2.2) MV e'         0.06 m/s    (>8.0) MV lateral e' 0.07 m/s MV medial e'  0.06 m/s MV A Dur:     128.45 msec E/e' Ratio:   7.34        (<8.0) MITRAL VALVE:                 Normal Ranges: MV DT: 226 msec (150-240msec) AORTIC VALVE:                                   Normal Ranges: AoV Vmax:                1.47 m/s (<=1.7m/s) AoV Peak P.6 mmHg (<20mmHg) AoV Mean P.2 mmHg (1.7-11.5mmHg) LVOT Max Paresh:            1.20 m/s (<=1.1m/s) AoV VTI:                 30.87 cm (18-25cm) LVOT VTI:                26.05 cm LVOT Diameter:           2.03 cm  (1.8-2.4cm) AoV Area, VTI:           2.72 cm2 (2.5-5.5cm2) AoV Area,Vmax:           2.63 cm2 (2.5-4.5cm2) AoV Dimensionless Index: 0.84  RIGHT VENTRICLE: RV Basal 3.58 cm RV Mid   3.10 cm RV Major 8.2 cm TAPSE:   27.7 mm RV s'    0.18 m/s TRICUSPID VALVE/RVSP:                             Normal Ranges: Peak TR Velocity: 2.58 m/s RV Syst Pressure: 29.7 mmHg (< 30mmHg) TV e'             0.1 m/s AORTA: Asc Ao Diam 3.39 cm  [id number] Roderick Cade DO Electronically signed on 2023 at 3:28:39 PM  ** Final **         2023 Echocardiogram   CONCLUSIONS:  1. Left ventricular systolic function is moderately decreased with a 25-30% estimated ejection fraction.  2. Spectral Doppler shows a pseudonormal pattern of left  ventricular diastolic filling.  3. There is moderate concentric left ventricular hypertrophy.  4. The left atrium is severely dilated.  5. There is global hypokinesis of the left ventricle with minor regional variations.     4/2023 CPET   Impression:     1. Abnormal cardiopulmonary exercise test.  2. Please refer to separate PFT results as well.  3. Parham functional class C (moderate to severe impairment).  4. Probable cause of functional impairment: Cardiac.  5. RER > 1 indicates good effort.  6. Peak VO2 of 14.1 mL/kg/min and/or VE/VC02 of 35 indicates high risk of complications.  7. Maximize guideline directed medical therapy.  8. Refer for advanced HF consultation.     5/2022 [facility]   CONCLUSIONS:   1. RHC:         RA: 5      RV: 28/5      PA: 28/12      PWP: 12         CO: 8.0 CI: 3.6.   2. Co-dominant coronary anatomy.   3. Mild non-onbstructive CAD.   4. Normal LVEDP.   5. No evidence of aortic stenosis on pullback.  Lab Results   Component Value Date    BUN 17 09/14/2023    CREATININE 1.07 09/14/2023     (H) 12/02/2022    MG 2.22 01/31/2023    K 3.8 09/14/2023     09/14/2023         Constitutional:       General: He is not in acute distress.  HENT:      Head: Normocephalic and atraumatic.      Mouth: Mucous membranes are moist.      Neck:  No JVD or HJR   Eyes:      Extraocular Movements: .      Conjunctiva/sclera: Conjunctivae normal.    Cardiovascular:      Rate and Rhythm: Normal rate and regular rhythm.      Heart sounds:  S1 S2 normal, no murmur, no S3 or S4   Pulmonary:      Effort: Pulmonary effort is normal. No respiratory distress.      Breath sounds: Normal breath sounds. No stridor. No wheezing or rales.   Abdominal:      General: Bowel sounds are normal. There is no distension.      Tenderness: There is no abdominal tenderness. There is no guarding or rebound.   Musculoskeletal:         General: No swelling, tenderness or deformity. Normal range of motion.      Comments:   Skin:      General: Skin is warm and dry.   Neurological:      General: No focal deficit present.      Mental Status: alert and oriented to person, place, and time. Mental status is at baseline.     Psychiatric:         Mood and Affect: Mood normal.     Assessment/Plan     Problem List Items Addressed This Visit       Chronic systolic heart failure (CMS/HCC)     Chronic  systolic heart failure  improved EF   Etiology non ischemic   AHA Stage: C   NYHA class:  Volume Status:   GFR: 73     GDMT:  BB- carvedilol 12.5 mg 1 tablet twice a day   -- HR is 60 BPM   ARB/ACEI/ARNI -  Entresto 97/103 mg 1 tablet twice a day   MRA - eplerenone 50 mg once a day   SGLT2i -  did not tolerate   Diuretic -  HCTZ  Device Therapy:  not needed due to improvement in EF    Hydralazine 50 mg 1 tablet twice      CHF:   He is taking medications as directed.  He has no sx of congestion.   He is feeling well.       Emphasized salt restriction.  Encouraged daily monitoring of the patient's weight.  Encouraged regular exercise.  Labs ordered today.  Follow up in 3 months.     2. HTN:   Patient states BP is much better controlled at home.             Jessica Hernandez, APRN-CNP

## 2024-02-28 ENCOUNTER — APPOINTMENT (OUTPATIENT)
Dept: CARDIOLOGY | Facility: HOSPITAL | Age: 74
End: 2024-02-28
Payer: COMMERCIAL

## 2024-03-05 ENCOUNTER — HOSPITAL ENCOUNTER (OUTPATIENT)
Dept: CARDIOLOGY | Facility: HOSPITAL | Age: 74
Discharge: HOME | End: 2024-03-05
Payer: MEDICARE

## 2024-03-05 DIAGNOSIS — I50.22 CHRONIC SYSTOLIC HEART FAILURE (MULTI): Primary | ICD-10-CM

## 2024-03-05 DIAGNOSIS — I50.22 CHRONIC SYSTOLIC HEART FAILURE (MULTI): ICD-10-CM

## 2024-03-05 PROCEDURE — 94621 CARDIOPULM EXERCISE TESTING: CPT

## 2024-03-05 PROCEDURE — 94621 CARDIOPULM EXERCISE TESTING: CPT | Performed by: INTERNAL MEDICINE

## 2024-03-29 PROBLEM — R69 DISEASE SUSPECTED: Status: ACTIVE | Noted: 2024-03-29

## 2024-03-29 PROBLEM — G47.33 OBSTRUCTIVE SLEEP APNEA SYNDROME: Status: ACTIVE | Noted: 2024-03-29

## 2024-03-29 PROBLEM — R00.1 BRADYCARDIA: Status: ACTIVE | Noted: 2022-02-18

## 2024-03-29 PROBLEM — U07.1 COVID-19: Status: ACTIVE | Noted: 2023-12-01

## 2024-03-29 PROBLEM — D64.9 ANEMIA: Status: ACTIVE | Noted: 2024-03-29

## 2024-03-29 PROBLEM — R79.89 ELEVATED LFTS: Status: ACTIVE | Noted: 2024-03-29

## 2024-03-29 PROBLEM — C44.300 PRIMARY MALIGNANT NEOPLASM OF SKIN OF FACE: Status: ACTIVE | Noted: 2024-03-29

## 2024-03-29 PROBLEM — F31.10 BIPOLAR AFFECTIVE DISORDER, CURRENT EPISODE MANIC (MULTI): Status: ACTIVE | Noted: 2024-03-29

## 2024-03-29 PROBLEM — K76.9 LESION OF LIVER GREATER THAN 1 CM IN DIAMETER: Status: ACTIVE | Noted: 2024-03-29

## 2024-03-29 PROBLEM — I42.9 CARDIOMYOPATHY (MULTI): Status: ACTIVE | Noted: 2022-05-12

## 2024-03-29 PROBLEM — E78.5 DYSLIPIDEMIA: Status: ACTIVE | Noted: 2022-05-12

## 2024-03-29 PROBLEM — I50.9 CONGESTIVE HEART FAILURE (MULTI): Status: ACTIVE | Noted: 2022-05-12

## 2024-03-29 PROBLEM — R10.9 ABDOMINAL PAIN: Status: ACTIVE | Noted: 2024-03-29

## 2024-03-29 PROBLEM — R42 DIZZINESS: Status: ACTIVE | Noted: 2022-12-02

## 2024-03-29 PROBLEM — Z86.79 HISTORY OF HYPERTENSION: Status: ACTIVE | Noted: 2024-03-29

## 2024-03-29 PROBLEM — Z85.038 HISTORY OF MALIGNANT NEOPLASM OF COLON: Status: ACTIVE | Noted: 2024-03-29

## 2024-03-29 PROBLEM — N17.9 AKI (ACUTE KIDNEY INJURY) (CMS-HCC): Status: ACTIVE | Noted: 2022-06-06

## 2024-03-29 PROBLEM — Z86.59 HISTORY OF DEPRESSION: Status: ACTIVE | Noted: 2024-03-29

## 2024-04-05 ENCOUNTER — OFFICE VISIT (OUTPATIENT)
Dept: CARDIOLOGY | Facility: CLINIC | Age: 74
End: 2024-04-05
Payer: MEDICARE

## 2024-04-05 VITALS
SYSTOLIC BLOOD PRESSURE: 162 MMHG | BODY MASS INDEX: 32.5 KG/M2 | HEIGHT: 70 IN | DIASTOLIC BLOOD PRESSURE: 86 MMHG | HEART RATE: 63 BPM | WEIGHT: 227 LBS

## 2024-04-05 DIAGNOSIS — I50.22 CHRONIC SYSTOLIC HEART FAILURE (MULTI): ICD-10-CM

## 2024-04-05 DIAGNOSIS — Z86.79 HISTORY OF HYPERTENSION: Primary | ICD-10-CM

## 2024-04-05 DIAGNOSIS — E78.5 DYSLIPIDEMIA: ICD-10-CM

## 2024-04-05 DIAGNOSIS — R00.1 BRADYCARDIA: ICD-10-CM

## 2024-04-05 PROCEDURE — 1159F MED LIST DOCD IN RCRD: CPT | Performed by: INTERNAL MEDICINE

## 2024-04-05 PROCEDURE — 1160F RVW MEDS BY RX/DR IN RCRD: CPT | Performed by: INTERNAL MEDICINE

## 2024-04-05 PROCEDURE — 3077F SYST BP >= 140 MM HG: CPT | Performed by: INTERNAL MEDICINE

## 2024-04-05 PROCEDURE — 3079F DIAST BP 80-89 MM HG: CPT | Performed by: INTERNAL MEDICINE

## 2024-04-05 PROCEDURE — 99215 OFFICE O/P EST HI 40 MIN: CPT | Performed by: INTERNAL MEDICINE

## 2024-04-05 PROCEDURE — 1036F TOBACCO NON-USER: CPT | Performed by: INTERNAL MEDICINE

## 2024-04-05 RX ORDER — HYDRALAZINE HYDROCHLORIDE 50 MG/1
50 TABLET, FILM COATED ORAL 2 TIMES DAILY
Qty: 180 TABLET | Refills: 3 | Status: SHIPPED | OUTPATIENT
Start: 2024-04-05 | End: 2025-04-05

## 2024-04-05 RX ORDER — CARVEDILOL 12.5 MG/1
6.25 TABLET ORAL
Qty: 90 TABLET | Refills: 3 | Status: SHIPPED | OUTPATIENT
Start: 2024-04-05 | End: 2025-04-05

## 2024-04-05 RX ORDER — EPLERENONE 50 MG/1
50 TABLET, FILM COATED ORAL DAILY
Qty: 90 TABLET | Refills: 3 | Status: SHIPPED | OUTPATIENT
Start: 2024-04-05 | End: 2025-04-05

## 2024-04-05 RX ORDER — AMLODIPINE BESYLATE 5 MG/1
5 TABLET ORAL DAILY
COMMUNITY
Start: 2024-04-04

## 2024-04-05 RX ORDER — HYDROCHLOROTHIAZIDE 25 MG/1
25 TABLET ORAL DAILY
Qty: 90 TABLET | Refills: 3 | Status: SHIPPED | OUTPATIENT
Start: 2024-04-05 | End: 2025-04-05

## 2024-04-05 NOTE — PATIENT INSTRUCTIONS
Thanks for following up in office today.    1)  You can go ahead and get the blood work done that was ordered for you.  If you get more SOB, more fatigued with the normal activity that you do then we may need to check your CPET testing.    2)  Give the amlodipine a  couple of weeks to work.  Spot check your blood pressure.     3)  Please continue your cardiac medications as prescribed.  I want you to think about having a home sleep study.  Talk to Jessica about this as well.    Follow up with Jessica as scheduled.  With me in one year   If you have any questions, please call [phone] and choose option for Dr. Dia's nurse Jesusita Hernández /

## 2024-04-05 NOTE — PROGRESS NOTES
Chief Complaint:   No chief complaint on file.     History Of Present Illness:    Dragan Prince is a 73 y.o. male presenting for yearly visit  H/o presumed NICM EF 20-25% > 40-45%, restrictive diastolic filling, moderate MR, HTN, FANNY, colon CA s/p CTX, acalculous cholecystitis, GERD, arthritis.    In hospital 12/2023 COVID 19 pneumonia , ARELI chronic systolic CHF compensated.  Tells me he went hospital initially for his wife - and then he himself was admitted.  Since then he has felt well.  Can walk up a couple flights of stairs without stopping.  No LE edema, orthopnea, PND.  Weight is down slightly, 227 lbs (good weight on home scale is ~ 227 lbs).  No dizziness, palpitations.  Just started amlodipine 5mg daily yesterday, ordered by PCP, first dose last night.    ROS:  The remainder of the review of systems was obtained, as was negative as pertains to the chief complaint.    CV testing reviewed:  CMP 12/2023  K 4.0  BUN 18  crea 0.9  alt 12  ast 13    BNP  11/2023 179    Cardiopulmonary  stress test 3/2024  1. Parham functional class C (moderate to severe impairment).   2. Probable cause of functional impairment: Cardiac.   3. RER > 1 indicates good effort.   4. Peak VO2 of 11.8 mL/kg/min and/or VE/VC02 of 27 indicates moderate risk of complications.   5. Maximize guideline directed medical therapy.   6. Repeat testing in future as clinically indicated.    TTE 12/2023  EF 40-45% global hypokinesis , moderate conc LVH, stage I DD, trace mitral , tricuspid valve regurg     Lipid labs 11/2022  chol 220, HDL 48.8    trig 119    L&RHC 5/2022  CONCLUSIONS:   1. RHC:         RA: 5      RV: 28/5      PA: 28/12      PWP: 12         CO: 8.0 CI: 3.6.   2. Co-dominant coronary anatomy.   3. Mild non-onbstructive CAD.   4. Normal LVEDP.   5. No evidence of aortic stenosis on pullback.         Prior hx:  The patient was hospitalized in 2/22 with dyspnea, chest discomfort, and abodminal pain. He has known LV dysfunction  and had prior care at [facility], [place] in [place], and at [facility]. He has undergone prior heart catheterization and does not have any stents. RUQ ultrasound demonstrated gallbladder thickening. He was diagnosed with acalculous cholecystitis. He was diuresed with IV lasix.     Last Recorded Vitals:  There were no vitals filed for this visit.    Past Medical History:  He has a past medical history of Bipolar disorder, current episode manic without psychotic features, unspecified (CMS/HCC), Other conditions influencing health status, Personal history of other diseases of male genital organs, Personal history of other diseases of the circulatory system, Personal history of other diseases of the digestive system, Personal history of other diseases of the musculoskeletal system and connective tissue, Personal history of other diseases of the nervous system and sense organs, Personal history of other malignant neoplasm of large intestine (05/13/2022), and Personal history of other mental and behavioral disorders.    Past Surgical History:  He has a past surgical history that includes Tonsillectomy (08/20/2013) and Colon surgery (09/01/2022).      Social History:  He reports that he has never smoked. He has never used smokeless tobacco. He reports that he does not drink alcohol and does not use drugs.    Family History:  No family history on file.     Allergies:  Morphine, Opioids - morphine analogues, and Spironolactone    Outpatient Medications:  Current Outpatient Medications   Medication Instructions    aspirin 81 mg, oral, Daily    carvedilol (COREG) 6.25 mg, oral, 2 times daily with meals    eplerenone (INSPRA) 50 mg, oral, Daily    hydrALAZINE (APRESOLINE) 50 mg, oral, 2 times daily    hydroCHLOROthiazide (HYDRODIURIL) 25 mg, oral, Daily    omeprazole (PriLOSEC) 20 mg DR capsule 1 capsule, oral, Daily    sacubitriL-valsartan (Entresto)  mg tablet 1 tablet, oral, 2 times daily       Physical  Exam:  Physical Exam  HENT:      Head: Normocephalic.      Nose: Nose normal.      Mouth/Throat:      Mouth: Mucous membranes are moist.   Cardiovascular:      Rate and Rhythm: Normal rate and regular rhythm.      Comments: No carotid bruits,   Bilateral trace leg swelling   Pulmonary:      Effort: Pulmonary effort is normal.      Breath sounds: Normal breath sounds.   Abdominal:      Palpations: Abdomen is soft.   Musculoskeletal:      Cervical back: Normal range of motion.   Skin:     General: Skin is warm and dry.   Neurological:      General: No focal deficit present.      Mental Status: He is alert.   Psychiatric:         Mood and Affect: Mood normal.            Last Labs:  CBC -  Lab Results   Component Value Date    WBC 7.8 12/02/2022    HGB 15.0 12/02/2022    HCT 44.8 12/02/2022    MCV 88 12/02/2022     12/02/2022       CMP -  Lab Results   Component Value Date    CALCIUM 9.6 09/14/2023    PROT 7.1 12/02/2022    ALBUMIN 4.1 12/02/2022    AST 15 12/02/2022    ALT 12 12/02/2022    ALKPHOS 65 12/02/2022    BILITOT 1.0 12/02/2022       LIPID PANEL -   Lab Results   Component Value Date    CHOL 220 (H) 10/06/2022    TRIG 119 10/06/2022    HDL 48.8 10/06/2022    CHHDL 4.5 10/06/2022    LDLF 147 (H) 10/06/2022    VLDL 24 10/06/2022       RENAL FUNCTION PANEL -   Lab Results   Component Value Date    GLUCOSE 106 (H) 09/14/2023     09/14/2023    K 3.8 09/14/2023     09/14/2023    CO2 31 09/14/2023    ANIONGAP 13 09/14/2023    BUN 17 09/14/2023    CREATININE 1.07 09/14/2023    GFRMALE 73 09/14/2023    CALCIUM 9.6 09/14/2023    ALBUMIN 4.1 12/02/2022        Lab Results   Component Value Date     (H) 12/02/2022         Assessment/Plan   NICM EF 20-25% > 40-45%  Restrictive diastolic filling  Moderate MR  HTN  FANNY - noncompliant with CPAP    Etiology non ischemic   AHA Stage: C   NYHA class: 2  Volume Status:  euvolemic   GFR: due for labs     GDMT:  BB- carvedilol 12.5 mg 1 tablet twice a day    -- HR is 60 BPM   ARB/ACEI/ARNI -  Entresto 97/103 mg 1 tablet twice a day   MRA - eplerenone 50 mg once a day   SGLT2i -  did not tolerate   Diuretic -  HCTZ  Device Therapy:  not needed due to improvement in EF    Hydralazine 50 mg 1 tablet twice     salt restriction.  Encouraged daily monitoring of the patient's weight - good weight seems to be ~ 227-230 lbs  Encouraged regular exercise.  Labs ordered - he will get them today    HTN:  PCP added amlodipine 5mg daily, first dose last night.  Rpt BP today -   -advised we monitor home BP's after above, it can take a couple weeks before full effect of med

## 2024-05-14 ENCOUNTER — HOSPITAL ENCOUNTER (OUTPATIENT)
Dept: GENERAL RADIOLOGY | Age: 74
Discharge: HOME | End: 2024-05-16
Payer: MEDICARE

## 2024-05-14 ENCOUNTER — HOSPITAL ENCOUNTER (OUTPATIENT)
Age: 74
Discharge: HOME | End: 2024-05-16
Payer: MEDICARE

## 2024-05-14 DIAGNOSIS — M25.562 LEFT KNEE PAIN, UNSPECIFIED CHRONICITY: ICD-10-CM

## 2024-05-14 PROCEDURE — 73560 X-RAY EXAM OF KNEE 1 OR 2: CPT

## 2024-06-06 DIAGNOSIS — M25.561 RIGHT KNEE PAIN, UNSPECIFIED CHRONICITY: ICD-10-CM

## 2024-06-06 DIAGNOSIS — M25.562 LEFT KNEE PAIN, UNSPECIFIED CHRONICITY: Primary | ICD-10-CM

## 2024-06-10 ENCOUNTER — OFFICE VISIT (OUTPATIENT)
Dept: ORTHOPEDIC SURGERY | Age: 74
End: 2024-06-10
Payer: MEDICARE

## 2024-06-10 VITALS — WEIGHT: 212 LBS | TEMPERATURE: 98 F | HEIGHT: 70 IN | BODY MASS INDEX: 30.35 KG/M2

## 2024-06-10 DIAGNOSIS — M25.562 CHRONIC PAIN OF LEFT KNEE: Primary | ICD-10-CM

## 2024-06-10 DIAGNOSIS — G89.29 CHRONIC PAIN OF LEFT KNEE: Primary | ICD-10-CM

## 2024-06-10 PROCEDURE — G8427 DOCREV CUR MEDS BY ELIG CLIN: HCPCS | Performed by: ORTHOPAEDIC SURGERY

## 2024-06-10 PROCEDURE — 1123F ACP DISCUSS/DSCN MKR DOCD: CPT | Performed by: ORTHOPAEDIC SURGERY

## 2024-06-10 PROCEDURE — 99203 OFFICE O/P NEW LOW 30 MIN: CPT | Performed by: ORTHOPAEDIC SURGERY

## 2024-06-10 PROCEDURE — 3017F COLORECTAL CA SCREEN DOC REV: CPT | Performed by: ORTHOPAEDIC SURGERY

## 2024-06-10 PROCEDURE — G8417 CALC BMI ABV UP PARAM F/U: HCPCS | Performed by: ORTHOPAEDIC SURGERY

## 2024-06-10 PROCEDURE — 1036F TOBACCO NON-USER: CPT | Performed by: ORTHOPAEDIC SURGERY

## 2024-06-10 RX ORDER — DICLOFENAC SODIUM 75 MG/1
75 TABLET, DELAYED RELEASE ORAL 2 TIMES DAILY
COMMUNITY
Start: 2024-05-31

## 2024-06-10 NOTE — PROGRESS NOTES
Ismael Garrett is a 74 y.o. male, who presents   Chief Complaint   Patient presents with    Knee Pain     B/L knee pain. Left knee has been very painful over the last few months. Right knee not as painful. Had no injury to knee. Very painful at night. Using voltaren gel with some relief for the knee. No previous surgery on knees.        HPI:: Mr. Garrett has left knee pain for 3 months or more.  There is no history of particular injury.  It has gotten worse and reached a point that he could not sleep on his side.  He got some diclofenac from Dr. Blackmon which did help with the symptoms significantly.  He is able to sleep on his side now.  He denies any swelling but has had some crepitus and some feelings of instability without falls.    Allergies; medications; past medical, surgical, family, and social history; and problem list have been reviewed today and updated as indicated in this encounter - see below following Ortho specifics.    Musculoskeletal: Skin condition gross neurovascular functions good in the left lower extremity.  Hip range of motion and stability are good without pain.    Left knee range of motion is 0 to 120 degrees or more.  He has stable collateral cruciate ligaments.  There is mild discomfort with stress examination with no major gapping.  There is no gross effusion in the knee is not hyperemic or erythematous.  Collateral and cruciate ligaments are stable.  Monty testing does result and a consistent medial joint line snapping.    Right knee examination is grossly unremarkable with good stability and range of motion and no complaint of pain.    Radiologic Studies: Imaging of both knees today with AP weightbearing films shows grossly normal right knee anatomy with very minimal marginal hypertrophic change.    The medial left knee joint compartment is somewhat narrowed consistent with articular cartilage wear.  There are early marginal hypertrophic changes.    ASSESSMENT:  Ismael was seen

## 2024-06-21 ENCOUNTER — OFFICE VISIT (OUTPATIENT)
Dept: CARDIOLOGY | Facility: HOSPITAL | Age: 74
End: 2024-06-21
Payer: MEDICARE

## 2024-06-21 ENCOUNTER — LAB (OUTPATIENT)
Dept: LAB | Facility: LAB | Age: 74
End: 2024-06-21
Payer: MEDICARE

## 2024-06-21 VITALS
WEIGHT: 229.1 LBS | DIASTOLIC BLOOD PRESSURE: 94 MMHG | OXYGEN SATURATION: 94 % | RESPIRATION RATE: 20 BRPM | BODY MASS INDEX: 32.87 KG/M2 | SYSTOLIC BLOOD PRESSURE: 166 MMHG | HEART RATE: 63 BPM

## 2024-06-21 DIAGNOSIS — I10 HYPERTENSION, UNSPECIFIED TYPE: ICD-10-CM

## 2024-06-21 DIAGNOSIS — I50.22 CHRONIC SYSTOLIC HEART FAILURE (MULTI): Primary | ICD-10-CM

## 2024-06-21 DIAGNOSIS — I50.22 CHRONIC SYSTOLIC HEART FAILURE (MULTI): ICD-10-CM

## 2024-06-21 LAB
ANION GAP SERPL CALC-SCNC: 14 MMOL/L (ref 10–20)
BNP SERPL-MCNC: 34 PG/ML (ref 0–99)
BUN SERPL-MCNC: 18 MG/DL (ref 6–23)
CALCIUM SERPL-MCNC: 9.5 MG/DL (ref 8.6–10.3)
CHLORIDE SERPL-SCNC: 102 MMOL/L (ref 98–107)
CO2 SERPL-SCNC: 26 MMOL/L (ref 21–32)
CREAT SERPL-MCNC: 1.2 MG/DL (ref 0.5–1.3)
EGFRCR SERPLBLD CKD-EPI 2021: 63 ML/MIN/1.73M*2
ERYTHROCYTE [DISTWIDTH] IN BLOOD BY AUTOMATED COUNT: 13 % (ref 11.5–14.5)
GLUCOSE SERPL-MCNC: 109 MG/DL (ref 74–99)
HCT VFR BLD AUTO: 42.5 % (ref 41–52)
HGB BLD-MCNC: 14.3 G/DL (ref 13.5–17.5)
MAGNESIUM SERPL-MCNC: 1.72 MG/DL (ref 1.6–2.4)
MCH RBC QN AUTO: 29.9 PG (ref 26–34)
MCHC RBC AUTO-ENTMCNC: 33.6 G/DL (ref 32–36)
MCV RBC AUTO: 89 FL (ref 80–100)
NRBC BLD-RTO: 0 /100 WBCS (ref 0–0)
PLATELET # BLD AUTO: 234 X10*3/UL (ref 150–450)
POTASSIUM SERPL-SCNC: 4 MMOL/L (ref 3.5–5.3)
RBC # BLD AUTO: 4.79 X10*6/UL (ref 4.5–5.9)
SODIUM SERPL-SCNC: 138 MMOL/L (ref 136–145)
WBC # BLD AUTO: 8.2 X10*3/UL (ref 4.4–11.3)

## 2024-06-21 PROCEDURE — 1159F MED LIST DOCD IN RCRD: CPT | Performed by: NURSE PRACTITIONER

## 2024-06-21 PROCEDURE — 3080F DIAST BP >= 90 MM HG: CPT | Performed by: NURSE PRACTITIONER

## 2024-06-21 PROCEDURE — 83880 ASSAY OF NATRIURETIC PEPTIDE: CPT

## 2024-06-21 PROCEDURE — 36415 COLL VENOUS BLD VENIPUNCTURE: CPT

## 2024-06-21 PROCEDURE — 3077F SYST BP >= 140 MM HG: CPT | Performed by: NURSE PRACTITIONER

## 2024-06-21 PROCEDURE — 99213 OFFICE O/P EST LOW 20 MIN: CPT | Performed by: NURSE PRACTITIONER

## 2024-06-21 PROCEDURE — 85027 COMPLETE CBC AUTOMATED: CPT

## 2024-06-21 PROCEDURE — 1036F TOBACCO NON-USER: CPT | Performed by: NURSE PRACTITIONER

## 2024-06-21 PROCEDURE — 83735 ASSAY OF MAGNESIUM: CPT

## 2024-06-21 PROCEDURE — 80048 BASIC METABOLIC PNL TOTAL CA: CPT

## 2024-06-21 RX ORDER — DICLOFENAC SODIUM 75 MG/1
75 TABLET, DELAYED RELEASE ORAL 2 TIMES DAILY
COMMUNITY

## 2024-06-21 RX ORDER — HYDRALAZINE HYDROCHLORIDE 50 MG/1
50 TABLET, FILM COATED ORAL 3 TIMES DAILY
Qty: 270 TABLET | Refills: 3 | Status: SHIPPED | OUTPATIENT
Start: 2024-06-21 | End: 2025-06-21

## 2024-06-21 SDOH — ECONOMIC STABILITY: FOOD INSECURITY: WITHIN THE PAST 12 MONTHS, YOU WORRIED THAT YOUR FOOD WOULD RUN OUT BEFORE YOU GOT MONEY TO BUY MORE.: NEVER TRUE

## 2024-06-21 SDOH — ECONOMIC STABILITY: FOOD INSECURITY: WITHIN THE PAST 12 MONTHS, THE FOOD YOU BOUGHT JUST DIDN'T LAST AND YOU DIDN'T HAVE MONEY TO GET MORE.: NEVER TRUE

## 2024-06-21 ASSESSMENT — ENCOUNTER SYMPTOMS
WHEEZING: 0
DEPRESSION: 0
CONFUSION: 0
OCCASIONAL FEELINGS OF UNSTEADINESS: 1
FEVER: 0
PALPITATIONS: 0
CHEST TIGHTNESS: 0
BLOOD IN STOOL: 0
SHORTNESS OF BREATH: 0
WEAKNESS: 0
ABDOMINAL DISTENTION: 0
COUGH: 0
LOSS OF SENSATION IN FEET: 0
HEMATURIA: 0
EYES NEGATIVE: 1
LIGHT-HEADEDNESS: 0
ACTIVITY CHANGE: 0
CHILLS: 0

## 2024-06-21 ASSESSMENT — PATIENT HEALTH QUESTIONNAIRE - PHQ9
1. LITTLE INTEREST OR PLEASURE IN DOING THINGS: NOT AT ALL
SUM OF ALL RESPONSES TO PHQ9 QUESTIONS 1 AND 2: 0
2. FEELING DOWN, DEPRESSED OR HOPELESS: NOT AT ALL

## 2024-06-21 ASSESSMENT — COLUMBIA-SUICIDE SEVERITY RATING SCALE - C-SSRS
1. IN THE PAST MONTH, HAVE YOU WISHED YOU WERE DEAD OR WISHED YOU COULD GO TO SLEEP AND NOT WAKE UP?: NO
6. HAVE YOU EVER DONE ANYTHING, STARTED TO DO ANYTHING, OR PREPARED TO DO ANYTHING TO END YOUR LIFE?: NO
2. HAVE YOU ACTUALLY HAD ANY THOUGHTS OF KILLING YOURSELF?: NO

## 2024-06-21 NOTE — PROGRESS NOTES
Subjective   Patient ID: Dragan Prince is a 74 y.o. male who presents for follow-up of congestive heart failure.     Current Outpatient Medications:     amLODIPine (Norvasc) 5 mg tablet, Take 1 tablet (5 mg) by mouth once daily., Disp: , Rfl:     aspirin 81 mg EC tablet, Take 1 tablet (81 mg) by mouth once daily., Disp: , Rfl:     carvedilol (Coreg) 12.5 mg tablet, Take 0.5 tablets (6.25 mg) by mouth 2 times a day with meals., Disp: 90 tablet, Rfl: 3    diclofenac (Voltaren) 75 mg EC tablet, Take 1 tablet (75 mg) by mouth 2 times a day. Do not crush, chew, or split., Disp: , Rfl:     eplerenone (Inspra) 50 mg tablet, Take 1 tablet (50 mg) by mouth once daily., Disp: 90 tablet, Rfl: 3    hydrALAZINE (Apresoline) 50 mg tablet, Take 1 tablet (50 mg) by mouth 2 times a day., Disp: 180 tablet, Rfl: 3    hydroCHLOROthiazide (HYDRODiuril) 25 mg tablet, Take 1 tablet (25 mg) by mouth once daily., Disp: 90 tablet, Rfl: 3    omeprazole (PriLOSEC) 20 mg DR capsule, Take 1 capsule (20 mg) by mouth once daily., Disp: , Rfl:     sacubitriL-valsartan (Entresto)  mg tablet, Take 1 tablet by mouth 2 times a day., Disp: , Rfl:      HPI   Patient is taking medications as directed. He denies orthopnea PND or edema. He denies chest pain or palpitations no focal neurological change dizziness near sina syncope.      Review of Systems   Constitutional:  Negative for activity change, chills and fever.   HENT:  Negative for hearing loss.    Eyes: Negative.    Respiratory:  Negative for cough, chest tightness, shortness of breath and wheezing.    Cardiovascular:  Negative for chest pain, palpitations and leg swelling.   Gastrointestinal:  Negative for abdominal distention and blood in stool.   Genitourinary:  Negative for hematuria.   Neurological:  Negative for syncope, weakness and light-headedness.   Psychiatric/Behavioral:  Negative for confusion.        Objective     BP (!) 166/94 (BP Location: Right arm, Patient Position:  Sitting, BP Cuff Size: Adult)   Pulse 63   Resp 20   Wt 104 kg (229 lb 1.6 oz)   SpO2 94%   BMI 32.87 kg/m²         Transthoracic echo (TTE) complete    Result Date: 12/13/2023              [place] [address]      Phone [phone] Fax [phone] TRANSTHORACIC ECHOCARDIOGRAM REPORT  Patient Name:      QUENTIN AUSTIN LAMIN      Reading Physician:    [id number] Roderick Cade DO Study Date:        12/13/2023           Ordering Provider:    [id number] LOUIE HARLEY MRN/PID:           [id number]             Fellow: Accession#:        [id number]         Nurse:                Barb Tran RN Date of Birth/Age: 1950 / 73 years Sonographer:          Rosaura Jones RDCS Gender:            M                    Additional Staff:     Ariadna Kang Height:            177.80 cm            Admit Date:           12/13/2023 Weight:            104.78 kg            Admission Status:     Inpatient -                                                               Routine BSA:               2.22 m2              Department Location:  [facility]                                                               Lab Blood Pressure: 148 /87 mmHg Study Type:    TRANSTHORACIC ECHO (TTE) COMPLETE Diagnosis/ICD: Chronic systolic (congestive) heart failure (CHF)-I50.22 Indication:    Congestive Heart Failure CPT Codes:     Echo Complete w Full Doppler-[id number]  Study Detail: The following Echo studies were performed: 2D, M-Mode, Doppler and               color flow. Optison used as a contrast agent for endocardial               border definition. Total contrast used for this procedure was 3 mL               via IV push.  PHYSICIAN INTERPRETATION: Left Ventricle: Left ventricular systolic function is mildly decreased, with an estimated ejection fraction of 40-45%. There is global  hypokinesis of the left ventricle with minor regional variations. The left ventricular cavity size is mildly dilated. There is moderate concentric left ventricular hypertrophy. Spectral Doppler shows an impaired relaxation pattern of left ventricular diastolic filling. Left Atrium: The left atrium is normal in size. Right Ventricle: The right ventricle is normal in size. There is normal right ventricular global systolic function. Right Atrium: The right atrium is normal in size. Aortic Valve: The aortic valve is trileaflet. There is mild aortic valve cusp calcification. There is no evidence of aortic valve regurgitation. The peak instantaneous gradient of the aortic valve is 8.6 mmHg. The mean gradient of the aortic valve is 5.2 mmHg. Mitral Valve: The mitral valve is normal in structure. There is mild mitral annular calcification. There is trace mitral valve regurgitation. Tricuspid Valve: The tricuspid valve is structurally normal. There is trace tricuspid regurgitation. Pulmonic Valve: The pulmonic valve is structurally normal. There is no indication of pulmonic valve regurgitation. Pericardium: There is no pericardial effusion noted. Aorta: The aortic root is normal.  CONCLUSIONS:  1. Left ventricular systolic function is mildly decreased with a 40-45% estimated ejection fraction.  2. Spectral Doppler shows an impaired relaxation pattern of left ventricular diastolic filling.  3. There is moderate concentric left ventricular hypertrophy.  4. There is global hypokinesis of the left ventricle with minor regional variations. QUANTITATIVE DATA SUMMARY: 2D MEASUREMENTS:                           Normal Ranges: LAs:           4.59 cm    (2.7-4.0cm) IVSd:          1.40 cm    (0.6-1.1cm) LVPWd:         1.48 cm    (0.6-1.1cm) LVIDd:         5.82 cm    (3.9-5.9cm) LVIDs:         4.06 cm LV Mass Index: 173.4 g/m2 LV % FS        30.2 % LA VOLUME:                               Normal Ranges: LA Vol A4C:        50.2 ml     (22+/-6mL/m2) LA Vol A2C:        55.7 ml LA Vol BP:         54.2 ml LA Vol Index A4C:  22.6ml/m2 LA Vol Index A2C:  25.1 ml/m2 LA Vol Index BP:   24.4 ml/m2 LA Area A4C:       18.5 cm2 LA Area A2C:       20.0 cm2 LA Major Axis A4C: 5.8 cm LA Major Axis A2C: 6.1 cm LA Volume Index:   24.0 ml/m2 LA Vol A4C:        49.2 ml LA Vol A2C:        53.9 ml RA VOLUME BY A/L METHOD:                       Normal Ranges: RA Area A4C: 16.3 cm2 M-MODE MEASUREMENTS:                  Normal Ranges: Ao Root: 3.20 cm (2.0-3.7cm) AORTA MEASUREMENTS:                    Normal Ranges: Asc Ao, d: 3.40 cm (2.1-3.4cm) LV SYSTOLIC FUNCTION BY 2D PLANIMETRY (MOD):                     Normal Ranges: EF-A4C View: 35.1 % (>=55%) EF-A2C View: 53.5 % EF-Biplane:  40.3 % LV DIASTOLIC FUNCTION:                           Normal Ranges: MV Peak E:    0.48 m/s    (0.7-1.2 m/s) MV Peak A:    0.81 m/s    (0.42-0.7 m/s) E/A Ratio:    0.59        (1.0-2.2) MV e'         0.06 m/s    (>8.0) MV lateral e' 0.07 m/s MV medial e'  0.06 m/s MV A Dur:     128.45 msec E/e' Ratio:   7.34        (<8.0) MITRAL VALVE:                 Normal Ranges: MV DT: 226 msec (150-240msec) AORTIC VALVE:                                   Normal Ranges: AoV Vmax:                1.47 m/s (<=1.7m/s) AoV Peak P.6 mmHg (<20mmHg) AoV Mean P.2 mmHg (1.7-11.5mmHg) LVOT Max Paresh:            1.20 m/s (<=1.1m/s) AoV VTI:                 30.87 cm (18-25cm) LVOT VTI:                26.05 cm LVOT Diameter:           2.03 cm  (1.8-2.4cm) AoV Area, VTI:           2.72 cm2 (2.5-5.5cm2) AoV Area,Vmax:           2.63 cm2 (2.5-4.5cm2) AoV Dimensionless Index: 0.84  RIGHT VENTRICLE: RV Basal 3.58 cm RV Mid   3.10 cm RV Major 8.2 cm TAPSE:   27.7 mm RV s'    0.18 m/s TRICUSPID VALVE/RVSP:                             Normal Ranges: Peak TR Velocity: 2.58 m/s RV Syst Pressure: 29.7 mmHg (< 30mmHg) TV e'             0.1 m/s AORTA: Asc Ao Diam 3.39 cm  [id number] Roderick Cade DO  Electronically signed on 12/13/2023 at 3:28:39 PM  ** Final **       Lab Results   Component Value Date    BUN 17 09/14/2023    CREATININE 1.07 09/14/2023     (H) 12/02/2022    MG 2.22 01/31/2023    K 3.8 09/14/2023     09/14/2023         Constitutional:       General: He is not in acute distress.  HENT:      Head: Normocephalic and atraumatic.      Mouth: Mucous membranes are moist.      Neck:  No JVD or HJR   Eyes:      Extraocular Movements: .      Conjunctiva/sclera: Conjunctivae normal.    Cardiovascular:      Rate and Rhythm: Normal rate and regular rhythm.      Heart sounds:  S1 S2 normal, no murmur, no S3 or S4   Pulmonary:      Effort: Pulmonary effort is normal. No respiratory distress.      Breath sounds: Normal breath sounds. No stridor. No wheezing or rales.   Abdominal:      General: Bowel sounds are normal. There is no distension.      Tenderness: There is no abdominal tenderness. There is no guarding or rebound.   Musculoskeletal:         General: No swelling, tenderness or deformity. Normal range of motion.      Comments:   Skin:     General: Skin is warm and dry.   Neurological:      General: No focal deficit present.      Mental Status: alert and oriented to person, place, and time. Mental status is at baseline.     Psychiatric:         Mood and Affect: Mood normal.     Assessment/Plan     Problem List Items Addressed This Visit       Chronic systolic heart failure (Multi)    Hypertension       Chronic  systolic heart failure  improved EF   Etiology non ischemic   AHA Stage: C   NYHA class:  Volume Status:   GFR: 73     GDMT:  BB- carvedilol 12.5 mg 1 tablet twice a day  ARB/ACEI/ARNI -  Entresto 97/103 mg 1 tablet twice a day   MRA - eplerenone 50 mg once a day   SGLT2i -  did not tolerate   Diuretic -  hydrochlorothiazide 25 mg daily   Device Therapy:  not needed due to improvement in EF    Hydralazine 50 mg 1 tablet three times a day.       CHF:   He is taking medications as  directed.  He has no sx of congestion.   He is feeling well.       Emphasized salt restriction.  Encouraged daily monitoring of the patient's weight.  Encouraged regular exercise.  Labs ordered today.  Follow up in 6 weeks.      2. HTN:   suboptimal today.  BP has also been elevated at home.  Increase the Hydralazine to 1 table three times a day.      Jessica Hernandez, APRN-CNP

## 2024-06-21 NOTE — PATIENT INSTRUCTIONS
Thank you for coming in today.  If you have any questions you may contact the office Monday through Friday at [phone] or on week ends at [phone].    Please take the Hydralazine 50 mg 1 tablet 3 times a day.     Please get the lab work completed today.     Please follow  a 2 GM sodium diet and limit fluid intake to 2 liters per day or 8 servings ( serving size = 8 oz. = 1 cup = 240 ml) per day.   Please avoid processed meat products (luncheon meats, sausages, mccrary, hot dogs for example) eat 4 servings of vegetables and 1-2 whole servings of whole fruits per day.   Please weigh daily and call [phone] for weight gain of 3 pounds in 24 hours or 5 pounds or if you experience increased swelling or shortness of breath.         Follow up:   6 weeks.     Please be sure to follow up with your cardiologist at [facility] once every year. Call [phone] to schedule appointment if you do not have a follow up appointment scheduled already.

## 2024-08-02 ENCOUNTER — APPOINTMENT (OUTPATIENT)
Dept: CARDIOLOGY | Facility: HOSPITAL | Age: 74
End: 2024-08-02
Payer: MEDICARE

## 2024-08-09 ENCOUNTER — APPOINTMENT (OUTPATIENT)
Dept: CARDIOLOGY | Facility: HOSPITAL | Age: 74
End: 2024-08-09
Payer: MEDICARE

## 2024-08-09 ENCOUNTER — OFFICE VISIT (OUTPATIENT)
Dept: CARDIOLOGY | Facility: HOSPITAL | Age: 74
End: 2024-08-09
Payer: MEDICARE

## 2024-08-09 VITALS
SYSTOLIC BLOOD PRESSURE: 143 MMHG | WEIGHT: 227.6 LBS | OXYGEN SATURATION: 95 % | DIASTOLIC BLOOD PRESSURE: 79 MMHG | RESPIRATION RATE: 18 BRPM | HEART RATE: 68 BPM | BODY MASS INDEX: 32.66 KG/M2

## 2024-08-09 DIAGNOSIS — I50.22 CHRONIC SYSTOLIC HEART FAILURE (MULTI): Primary | ICD-10-CM

## 2024-08-09 DIAGNOSIS — I10 HYPERTENSION, UNSPECIFIED TYPE: ICD-10-CM

## 2024-08-09 DIAGNOSIS — I73.9 INTERMITTENT CLAUDICATION (CMS-HCC): ICD-10-CM

## 2024-08-09 PROCEDURE — 99213 OFFICE O/P EST LOW 20 MIN: CPT | Performed by: NURSE PRACTITIONER

## 2024-08-09 PROCEDURE — 1036F TOBACCO NON-USER: CPT | Performed by: NURSE PRACTITIONER

## 2024-08-09 PROCEDURE — 1159F MED LIST DOCD IN RCRD: CPT | Performed by: NURSE PRACTITIONER

## 2024-08-09 PROCEDURE — 3078F DIAST BP <80 MM HG: CPT | Performed by: NURSE PRACTITIONER

## 2024-08-09 PROCEDURE — 3077F SYST BP >= 140 MM HG: CPT | Performed by: NURSE PRACTITIONER

## 2024-08-09 RX ORDER — AMLODIPINE BESYLATE 5 MG/1
10 TABLET ORAL DAILY
Qty: 90 TABLET | Refills: 3 | Status: SHIPPED | OUTPATIENT
Start: 2024-08-09

## 2024-08-09 RX ORDER — CALCIUM CARBONATE 300MG(750)
400 TABLET,CHEWABLE ORAL DAILY
Qty: 90 TABLET | Refills: 1 | Status: SHIPPED | OUTPATIENT
Start: 2024-08-09

## 2024-08-09 SDOH — ECONOMIC STABILITY: FOOD INSECURITY: WITHIN THE PAST 12 MONTHS, THE FOOD YOU BOUGHT JUST DIDN'T LAST AND YOU DIDN'T HAVE MONEY TO GET MORE.: NEVER TRUE

## 2024-08-09 SDOH — ECONOMIC STABILITY: FOOD INSECURITY: WITHIN THE PAST 12 MONTHS, YOU WORRIED THAT YOUR FOOD WOULD RUN OUT BEFORE YOU GOT MONEY TO BUY MORE.: NEVER TRUE

## 2024-08-09 ASSESSMENT — PATIENT HEALTH QUESTIONNAIRE - PHQ9
SUM OF ALL RESPONSES TO PHQ9 QUESTIONS 1 AND 2: 0
1. LITTLE INTEREST OR PLEASURE IN DOING THINGS: NOT AT ALL
2. FEELING DOWN, DEPRESSED OR HOPELESS: NOT AT ALL

## 2024-08-09 ASSESSMENT — ENCOUNTER SYMPTOMS
OCCASIONAL FEELINGS OF UNSTEADINESS: 1
LOSS OF SENSATION IN FEET: 1
CHILLS: 0
COUGH: 0
ABDOMINAL DISTENTION: 0
CHEST TIGHTNESS: 0
ACTIVITY CHANGE: 0
LIGHT-HEADEDNESS: 0
ARTHRALGIAS: 1
SHORTNESS OF BREATH: 0
DEPRESSION: 0
WEAKNESS: 0
CONFUSION: 0
EYES NEGATIVE: 1
FEVER: 0
WHEEZING: 0
BLOOD IN STOOL: 0
HEMATURIA: 0
PALPITATIONS: 0

## 2024-08-09 NOTE — PATIENT INSTRUCTIONS
Thank you for coming in today.  If you have any questions you may contact the office Monday through Friday at [phone] or on week ends at [phone].    Please increase the Norvasc to 10 mg once a day.     Please start the Magnesium 400 mg once a day.     Please have lab work completed in 6-12 weeks.     Please call and schedule the PVR testing for the legs.     Please follow  a 2 GM sodium diet and limit fluid intake to 2 liters per day or 8 servings ( serving size = 8 oz. = 1 cup = 240 ml) per day.   Please avoid processed meat products (luncheon meats, sausages, mccrary, hot dogs for example) eat 4 servings of vegetables and 1-2 whole servings of whole fruits per day.   Please weigh daily and call [phone] for weight gain of 3 pounds in 24 hours or 5 pounds or if you experience increased swelling or shortness of breath.         Follow up:  3 months.     Please be sure to follow up with your cardiologist at [facility] once every year. Call [phone] to schedule appointment if you do not have a follow up appointment scheduled already.

## 2024-08-09 NOTE — PROGRESS NOTES
Subjective   Patient ID: Dragan Prince is a 74 y.o. male who presents for follow-up of congestive heart failure.     Current Outpatient Medications:     amLODIPine (Norvasc) 5 mg tablet, Take 1 tablet (5 mg) by mouth once daily., Disp: , Rfl:     aspirin 81 mg EC tablet, Take 1 tablet (81 mg) by mouth once daily., Disp: , Rfl:     carvedilol (Coreg) 12.5 mg tablet, Take 0.5 tablets (6.25 mg) by mouth 2 times a day with meals., Disp: 90 tablet, Rfl: 3    diclofenac (Voltaren) 75 mg EC tablet, Take 1 tablet (75 mg) by mouth 2 times a day. Do not crush, chew, or split., Disp: , Rfl:     eplerenone (Inspra) 50 mg tablet, Take 1 tablet (50 mg) by mouth once daily., Disp: 90 tablet, Rfl: 3    hydrALAZINE (Apresoline) 50 mg tablet, Take 1 tablet (50 mg) by mouth 3 times a day., Disp: 270 tablet, Rfl: 3    hydroCHLOROthiazide (HYDRODiuril) 25 mg tablet, Take 1 tablet (25 mg) by mouth once daily., Disp: 90 tablet, Rfl: 3    omeprazole (PriLOSEC) 20 mg DR capsule, Take 1 capsule (20 mg) by mouth once daily., Disp: , Rfl:     sacubitriL-valsartan (Entresto)  mg tablet, Take 1 tablet by mouth 2 times a day., Disp: , Rfl:      HPI     Patient is taking medications as directed. He denies orthopnea PND or edema. He denies chest pain or palpitations no focal neurological change dizziness near sina syncope.      Review of Systems   Constitutional:  Negative for activity change, chills and fever.   HENT:  Negative for hearing loss.    Eyes: Negative.    Respiratory:  Negative for cough, chest tightness, shortness of breath and wheezing.    Cardiovascular:  Negative for chest pain, palpitations and leg swelling.   Gastrointestinal:  Negative for abdominal distention and blood in stool.   Genitourinary:  Negative for hematuria.   Musculoskeletal:  Positive for arthralgias.        Still complaining of cramping of muscles at night.    He has had some right knee pain.    Neurological:  Negative for syncope, weakness and  light-headedness.   Psychiatric/Behavioral:  Negative for confusion.        Objective     /79 (BP Location: Right arm, Patient Position: Sitting, BP Cuff Size: Adult)   Pulse 68   Resp 18   Wt 103 kg (227 lb 9.6 oz)   SpO2 95%   BMI 32.66 kg/m²         Transthoracic echo (TTE) complete    Result Date: 12/13/2023              [place] [id number]      Phone [phone] Fax [phone] TRANSTHORACIC ECHOCARDIOGRAM REPORT  Patient Name:      QUENTIN De Guzman Physician:    [id number] Roderick Cade DO Study Date:        12/13/2023           Ordering Provider:    [id number] LOUIE HARLEY MRN/PID:           [id number]             Fellow: Accession#:        [id number]         Nurse:                Barb Tran RN Date of Birth/Age: 1950 / 73 years Sonographer:          Rosaura Jones MARCIA Gender:            M                    Additional Staff:     Ariadna Student Height:            177.80 cm            Admit Date:           12/13/2023 Weight:            104.78 kg            Admission Status:     Inpatient -                                                               Routine BSA:               2.22 m2              Department Location:  [facility] Echo                                                               Lab Blood Pressure: 148 /87 mmHg Study Type:    TRANSTHORACIC ECHO (TTE) COMPLETE Diagnosis/ICD: Chronic systolic (congestive) heart failure (CHF)-I50.22 Indication:    Congestive Heart Failure CPT Codes:     Echo Complete w Full Doppler-[id number]  Study Detail: The following Echo studies were performed: 2D, M-Mode, Doppler and               color flow. Optison used as a contrast agent for endocardial               border definition. Total contrast used for this procedure was 3 mL               via IV push.  PHYSICIAN  INTERPRETATION: Left Ventricle: Left ventricular systolic function is mildly decreased, with an estimated ejection fraction of 40-45%. There is global hypokinesis of the left ventricle with minor regional variations. The left ventricular cavity size is mildly dilated. There is moderate concentric left ventricular hypertrophy. Spectral Doppler shows an impaired relaxation pattern of left ventricular diastolic filling. Left Atrium: The left atrium is normal in size. Right Ventricle: The right ventricle is normal in size. There is normal right ventricular global systolic function. Right Atrium: The right atrium is normal in size. Aortic Valve: The aortic valve is trileaflet. There is mild aortic valve cusp calcification. There is no evidence of aortic valve regurgitation. The peak instantaneous gradient of the aortic valve is 8.6 mmHg. The mean gradient of the aortic valve is 5.2 mmHg. Mitral Valve: The mitral valve is normal in structure. There is mild mitral annular calcification. There is trace mitral valve regurgitation. Tricuspid Valve: The tricuspid valve is structurally normal. There is trace tricuspid regurgitation. Pulmonic Valve: The pulmonic valve is structurally normal. There is no indication of pulmonic valve regurgitation. Pericardium: There is no pericardial effusion noted. Aorta: The aortic root is normal.  CONCLUSIONS:  1. Left ventricular systolic function is mildly decreased with a 40-45% estimated ejection fraction.  2. Spectral Doppler shows an impaired relaxation pattern of left ventricular diastolic filling.  3. There is moderate concentric left ventricular hypertrophy.  4. There is global hypokinesis of the left ventricle with minor regional variations. QUANTITATIVE DATA SUMMARY: 2D MEASUREMENTS:                           Normal Ranges: LAs:           4.59 cm    (2.7-4.0cm) IVSd:          1.40 cm    (0.6-1.1cm) LVPWd:         1.48 cm    (0.6-1.1cm) LVIDd:         5.82 cm    (3.9-5.9cm) LVIDs:          4.06 cm LV Mass Index: 173.4 g/m2 LV % FS        30.2 % LA VOLUME:                               Normal Ranges: LA Vol A4C:        50.2 ml    (22+/-6mL/m2) LA Vol A2C:        55.7 ml LA Vol BP:         54.2 ml LA Vol Index A4C:  22.6ml/m2 LA Vol Index A2C:  25.1 ml/m2 LA Vol Index BP:   24.4 ml/m2 LA Area A4C:       18.5 cm2 LA Area A2C:       20.0 cm2 LA Major Axis A4C: 5.8 cm LA Major Axis A2C: 6.1 cm LA Volume Index:   24.0 ml/m2 LA Vol A4C:        49.2 ml LA Vol A2C:        53.9 ml RA VOLUME BY A/L METHOD:                       Normal Ranges: RA Area A4C: 16.3 cm2 M-MODE MEASUREMENTS:                  Normal Ranges: Ao Root: 3.20 cm (2.0-3.7cm) AORTA MEASUREMENTS:                    Normal Ranges: Asc Ao, d: 3.40 cm (2.1-3.4cm) LV SYSTOLIC FUNCTION BY 2D PLANIMETRY (MOD):                     Normal Ranges: EF-A4C View: 35.1 % (>=55%) EF-A2C View: 53.5 % EF-Biplane:  40.3 % LV DIASTOLIC FUNCTION:                           Normal Ranges: MV Peak E:    0.48 m/s    (0.7-1.2 m/s) MV Peak A:    0.81 m/s    (0.42-0.7 m/s) E/A Ratio:    0.59        (1.0-2.2) MV e'         0.06 m/s    (>8.0) MV lateral e' 0.07 m/s MV medial e'  0.06 m/s MV A Dur:     128.45 msec E/e' Ratio:   7.34        (<8.0) MITRAL VALVE:                 Normal Ranges: MV DT: 226 msec (150-240msec) AORTIC VALVE:                                   Normal Ranges: AoV Vmax:                1.47 m/s (<=1.7m/s) AoV Peak P.6 mmHg (<20mmHg) AoV Mean P.2 mmHg (1.7-11.5mmHg) LVOT Max Paresh:            1.20 m/s (<=1.1m/s) AoV VTI:                 30.87 cm (18-25cm) LVOT VTI:                26.05 cm LVOT Diameter:           2.03 cm  (1.8-2.4cm) AoV Area, VTI:           2.72 cm2 (2.5-5.5cm2) AoV Area,Vmax:           2.63 cm2 (2.5-4.5cm2) AoV Dimensionless Index: 0.84  RIGHT VENTRICLE: RV Basal 3.58 cm RV Mid   3.10 cm RV Major 8.2 cm TAPSE:   27.7 mm RV s'    0.18 m/s TRICUSPID VALVE/RVSP:                             Normal  Ranges: Peak TR Velocity: 2.58 m/s RV Syst Pressure: 29.7 mmHg (< 30mmHg) TV e'             0.1 m/s AORTA: Asc Ao Diam 3.39 cm  [id number] Roderick Cade DO Electronically signed on 12/13/2023 at 3:28:39 PM  ** Final **       Lab Results   Component Value Date    BUN 18 06/21/2024    CREATININE 1.20 06/21/2024    BNP 34 06/21/2024    MG 1.72 06/21/2024    K 4.0 06/21/2024     06/21/2024       Constitutional:       General:  NAD   HENT:      Head: Normocephalic     Mouth: Mucous membranes are moist.      Neck:  No JVD or HJR   Eyes:      Conjunctiva/sclera: Conjunctivae normal.    Cardiovascular:      Rate and Rhythm: Normal rate and regular rhythm     Heart sounds:  S1 S2 normal, no murmur. no S3 or S4   Pulmonary:      Pulmonary effort is normal.  Normal breath sounds. No wheezing or rales.   Abdominal:      General: Bowel sounds are normal, non- tender to palpitations. Liver is non palpable at  right costal margin.   Musculoskeletal:         General: No swelling/ bilateral lower edema.  GANN well.   Skin:     General: Skin is warm and dry  Neurological:      General: No focal deficit present.      Mental Status: alert and oriented to person, place, and time. Mental status is at baseline.     Psychiatric:         Mood and Affect: Normal Affect.     Assessment/Plan     Problem List Items Addressed This Visit       Chronic systolic heart failure (Multi)    Hypertension      Chronic  systolic heart failure  improved EF   Etiology non ischemic   AHA Stage: C   NYHA class:  Volume Status:   GFR: 73     GDMT:  BB- carvedilol 12.5 mg 1 tablet twice a day  ARB/ACEI/ARNI -  Entresto 97/103 mg 1 tablet twice a day   MRA - eplerenone 50 mg once a day   SGLT2i -  did not tolerate   Diuretic -  hydrochlorothiazide 25 mg daily   Device Therapy:  not needed due to improvement in EF    Hydralazine 50 mg 1 tablet three times a day.    Norvasc 5 mg once a day.   -- increase the Norvasc to 10 mg once a day.      CHF:   He is taking  medications as directed.  He has no sx of congestion.   He is feeling well.       Emphasized salt restriction.  Encouraged daily monitoring of the patient's weight.  Encouraged regular exercise.  Labs ordered today.  Follow up in  6 weeks.       2. HTN:   Suboptimal.   Will increase the Norvasc to 10 mg once a day.     3.  Leg cramping and weakness:   Will check PVR.   Adding magnesium to medication regimen.   Recheck lab 6-12 weeks.          Jessica Hernandez, JOSSELINE-CNP

## 2024-08-19 DIAGNOSIS — G89.29 CHRONIC PAIN OF LEFT KNEE: Primary | ICD-10-CM

## 2024-08-19 DIAGNOSIS — M25.562 CHRONIC PAIN OF LEFT KNEE: Primary | ICD-10-CM

## 2024-08-20 ENCOUNTER — APPOINTMENT (OUTPATIENT)
Dept: CARDIOLOGY | Facility: HOSPITAL | Age: 74
End: 2024-08-20
Payer: COMMERCIAL

## 2024-10-24 ENCOUNTER — LAB (OUTPATIENT)
Dept: LAB | Facility: LAB | Age: 74
End: 2024-10-24
Payer: MEDICARE

## 2024-10-24 DIAGNOSIS — I50.22 CHRONIC SYSTOLIC HEART FAILURE: ICD-10-CM

## 2024-10-24 DIAGNOSIS — I10 HYPERTENSION, UNSPECIFIED TYPE: ICD-10-CM

## 2024-10-24 LAB
ANION GAP SERPL CALC-SCNC: 12 MMOL/L (ref 10–20)
BNP SERPL-MCNC: 48 PG/ML (ref 0–99)
BUN SERPL-MCNC: 17 MG/DL (ref 6–23)
CALCIUM SERPL-MCNC: 9.6 MG/DL (ref 8.6–10.3)
CHLORIDE SERPL-SCNC: 101 MMOL/L (ref 98–107)
CO2 SERPL-SCNC: 31 MMOL/L (ref 21–32)
CREAT SERPL-MCNC: 1.08 MG/DL (ref 0.5–1.3)
EGFRCR SERPLBLD CKD-EPI 2021: 72 ML/MIN/1.73M*2
GLUCOSE SERPL-MCNC: 97 MG/DL (ref 74–99)
MAGNESIUM SERPL-MCNC: 1.82 MG/DL (ref 1.6–2.4)
POTASSIUM SERPL-SCNC: 4.1 MMOL/L (ref 3.5–5.3)
SODIUM SERPL-SCNC: 140 MMOL/L (ref 136–145)

## 2024-10-24 PROCEDURE — 83735 ASSAY OF MAGNESIUM: CPT

## 2024-10-24 PROCEDURE — 36415 COLL VENOUS BLD VENIPUNCTURE: CPT

## 2024-10-24 PROCEDURE — 83880 ASSAY OF NATRIURETIC PEPTIDE: CPT

## 2024-10-24 PROCEDURE — 80048 BASIC METABOLIC PNL TOTAL CA: CPT

## 2024-10-25 ENCOUNTER — TELEPHONE (OUTPATIENT)
Dept: CARDIOLOGY | Facility: HOSPITAL | Age: 74
End: 2024-10-25
Payer: COMMERCIAL

## 2024-10-25 NOTE — TELEPHONE ENCOUNTER
----- Message from Jessica Hernandez sent at 10/25/2024  2:45 PM EDT -----  Please call patient with normal lab. No changes. Thanks.     Patient was called and the message above was given to Dragan. Patient is encouraged to call back with any questions or concerns.

## 2024-11-12 ENCOUNTER — TRANSCRIBE ORDERS (OUTPATIENT)
Age: 74
End: 2024-11-12

## 2024-11-12 DIAGNOSIS — I73.9 PAD (PERIPHERAL ARTERY DISEASE) (HCC): Primary | ICD-10-CM

## 2024-11-12 DIAGNOSIS — R60.1 GENERALIZED EDEMA: ICD-10-CM

## 2024-11-13 ENCOUNTER — OFFICE VISIT (OUTPATIENT)
Dept: ORTHOPEDIC SURGERY | Age: 74
End: 2024-11-13

## 2024-11-13 VITALS — HEIGHT: 70 IN | TEMPERATURE: 98.1 F | BODY MASS INDEX: 32.93 KG/M2 | WEIGHT: 230 LBS

## 2024-11-13 DIAGNOSIS — M25.562 CHRONIC PAIN OF LEFT KNEE: Primary | ICD-10-CM

## 2024-11-13 DIAGNOSIS — G89.29 CHRONIC PAIN OF LEFT KNEE: Primary | ICD-10-CM

## 2024-11-13 NOTE — PROGRESS NOTES
MD at [facility] OR    EYE SURGERY Left 8/23/2023    LEFT EYE CATARACT EXTRACTION IOL IMPLANT performed by Man Munoz MD at [facility] OR    LUNG BIOPSY  12/19/2012    OTHER SURGICAL HISTORY Right 09/20/2017    Laparoscopic repair of the right inguinal hernia with mesh     SKIN BIOPSY      SKIN CANCER EXCISION  2010 nose    TONSILLECTOMY  1975    UPPER GASTROINTESTINAL ENDOSCOPY         Allergies   Allergen Reactions    Morphine Other (See Comments)     Pass out    Nitroglycerin Other (See Comments)     Passes out with 5 doses    Spironolactone Other (See Comments)     Breast tenderness       Social History     Socioeconomic History    Marital status:      Spouse name: None    Number of children: None    Years of education: None    Highest education level: None   Tobacco Use    Smoking status: Never    Smokeless tobacco: Never   Vaping Use    Vaping status: Never Used   Substance and Sexual Activity    Alcohol use: No    Drug use: No     Social Determinants of Health     Food Insecurity: No Food Insecurity (8/9/2024)    Received from [facility]    Hunger Vital Sign     Worried About Running Out of Food in the Last Year: Never true     Ran Out of Food in the Last Year: Never true   Transportation Needs: No Transportation Needs (11/30/2023)    PRAPARE - Transportation     Lack of Transportation (Medical): No     Lack of Transportation (Non-Medical): No   Housing Stability: Low Risk  (11/30/2023)    Housing Stability Vital Sign     Unable to Pay for Housing in the Last Year: No     Number of Places Lived in the Last Year: 1     Unstable Housing in the Last Year: No       Review of Systems  As follows except as previously noted in HPI:  Constitutional: Negative for chills, diaphoresis, fatigue, fever and unexpected weight change.   Respiratory: Negative for cough, shortness of breath and wheezing.    Cardiovascular: Negative for chest pain and palpitations.   Neurological: Negative for

## 2024-11-14 ENCOUNTER — OFFICE VISIT (OUTPATIENT)
Dept: CARDIOLOGY | Facility: HOSPITAL | Age: 74
End: 2024-11-14
Payer: MEDICARE

## 2024-11-14 VITALS
BODY MASS INDEX: 32.3 KG/M2 | OXYGEN SATURATION: 100 % | DIASTOLIC BLOOD PRESSURE: 71 MMHG | HEART RATE: 74 BPM | RESPIRATION RATE: 18 BRPM | SYSTOLIC BLOOD PRESSURE: 153 MMHG | WEIGHT: 225.1 LBS

## 2024-11-14 DIAGNOSIS — I50.22 CHRONIC SYSTOLIC HEART FAILURE: Primary | ICD-10-CM

## 2024-11-14 DIAGNOSIS — I73.9 INTERMITTENT CLAUDICATION (CMS-HCC): ICD-10-CM

## 2024-11-14 DIAGNOSIS — I10 HYPERTENSION, UNSPECIFIED TYPE: ICD-10-CM

## 2024-11-14 PROCEDURE — 3077F SYST BP >= 140 MM HG: CPT | Performed by: NURSE PRACTITIONER

## 2024-11-14 PROCEDURE — 99212 OFFICE O/P EST SF 10 MIN: CPT | Performed by: NURSE PRACTITIONER

## 2024-11-14 PROCEDURE — 3078F DIAST BP <80 MM HG: CPT | Performed by: NURSE PRACTITIONER

## 2024-11-14 PROCEDURE — 1159F MED LIST DOCD IN RCRD: CPT | Performed by: NURSE PRACTITIONER

## 2024-11-14 PROCEDURE — 1126F AMNT PAIN NOTED NONE PRSNT: CPT | Performed by: NURSE PRACTITIONER

## 2024-11-14 RX ORDER — SACUBITRIL AND VALSARTAN 97; 103 MG/1; MG/1
1 TABLET, FILM COATED ORAL 2 TIMES DAILY
Qty: 180 TABLET | Refills: 3 | Status: SHIPPED | OUTPATIENT
Start: 2024-11-14 | End: 2025-11-14

## 2024-11-14 ASSESSMENT — ENCOUNTER SYMPTOMS
COUGH: 0
LOSS OF SENSATION IN FEET: 0
WHEEZING: 0
CONFUSION: 0
SHORTNESS OF BREATH: 0
ACTIVITY CHANGE: 0
EYES NEGATIVE: 1
PALPITATIONS: 0
WEAKNESS: 0
DEPRESSION: 0
ABDOMINAL DISTENTION: 0
CHILLS: 0
CHEST TIGHTNESS: 0
FEVER: 0
HEMATURIA: 0
LIGHT-HEADEDNESS: 0
OCCASIONAL FEELINGS OF UNSTEADINESS: 0
BLOOD IN STOOL: 0

## 2024-11-14 ASSESSMENT — PAIN SCALES - GENERAL: PAINLEVEL_OUTOF10: 0-NO PAIN

## 2024-11-14 NOTE — PATIENT INSTRUCTIONS
Thank you for coming in today.  If you have any questions you may contact the office Monday through Friday at [phone] or on week ends at [phone].    Continue current medications.       If possible please fax recent lab work and vascular studies of LE when available to [phone].    Please follow  a 2 GM sodium diet and limit fluid intake to 2 liters per day or 8 servings ( serving size = 8 oz. = 1 cup = 240 ml) per day.   Please avoid processed meat products (luncheon meats, sausages, mccrary, hot dogs for example) eat 4 servings of vegetables and 1-2 whole servings of whole fruits per day.   Please weigh daily and call [phone] for weight gain of 3 pounds in 24 hours or 5 pounds or if you experience increased swelling or shortness of breath.      Follow up: 6-8 weeks.     Please be sure to follow up with your cardiologist at [facility] [place] once every year. Call [phone] to schedule appointment if you do not have a follow up appointment scheduled already.

## 2024-11-14 NOTE — PROGRESS NOTES
Subjective   Patient ID: Dragan Prince is a 74 y.o. male who presents for follow-up of congestive heart failure.     Current Outpatient Medications:     amLODIPine (Norvasc) 5 mg tablet, Take 2 tablets (10 mg) by mouth once daily., Disp: 90 tablet, Rfl: 3    aspirin 81 mg EC tablet, Take 1 tablet (81 mg) by mouth once daily., Disp: , Rfl:     carvedilol (Coreg) 12.5 mg tablet, Take 0.5 tablets (6.25 mg) by mouth 2 times a day with meals., Disp: 90 tablet, Rfl: 3    diclofenac (Voltaren) 75 mg EC tablet, Take 1 tablet (75 mg) by mouth 2 times a day. Do not crush, chew, or split., Disp: , Rfl:     eplerenone (Inspra) 50 mg tablet, Take 1 tablet (50 mg) by mouth once daily., Disp: 90 tablet, Rfl: 3    hydrALAZINE (Apresoline) 50 mg tablet, Take 1 tablet (50 mg) by mouth 3 times a day., Disp: 270 tablet, Rfl: 3    hydroCHLOROthiazide (HYDRODiuril) 25 mg tablet, Take 1 tablet (25 mg) by mouth once daily., Disp: 90 tablet, Rfl: 3    omeprazole (PriLOSEC) 20 mg DR capsule, Take 1 capsule (20 mg) by mouth once daily., Disp: , Rfl:     sacubitriL-valsartan (Entresto)  mg tablet, Take 1 tablet by mouth 2 times a day., Disp: , Rfl:     magnesium oxide (Mag-Ox) 400 mg tablet, Take 1 tablet (400 mg) by mouth once daily. (Patient not taking: Reported on 11/14/2024), Disp: 90 tablet, Rfl: 1     HPI     Patient is taking medications as directed. He denies orthopnea PND or edema. He denies chest pain or palpitations no focal neurological change dizziness near sina syncope.      Review of Systems   Constitutional:  Negative for activity change, chills and fever.   HENT:  Negative for hearing loss.    Eyes: Negative.    Respiratory:  Negative for cough, chest tightness, shortness of breath and wheezing.    Cardiovascular:  Negative for chest pain, palpitations and leg swelling.   Gastrointestinal:  Negative for abdominal distention and blood in stool.   Genitourinary:  Negative for hematuria.   Musculoskeletal:         Cramps    Neurological:  Negative for syncope, weakness and light-headedness.   Psychiatric/Behavioral:  Negative for confusion.        Objective     /71 (BP Location: Left arm, Patient Position: Sitting) Comment: Siddharth has not taken meds yet today.  Pulse 74   Resp 18   Wt 102 kg (225 lb 1.6 oz)   SpO2 100%   BMI 32.30 kg/m²         Transthoracic echo (TTE) complete    Result Date: 12/13/2023              [place] [address]      Phone [phone] Fax [phone] TRANSTHORACIC ECHOCARDIOGRAM REPORT  Patient Name:      QUENTIN AVILA LAMIN De Guzman Physician:    [id number] Roderick Cade DO Study Date:        12/13/2023           Ordering Provider:    [id number] LOUIE HARLEY MRN/PID:           [id number]             Fellow: Accession#:        [id number]         Nurse:                Barb Tran RN Date of Birth/Age: 1950 / 73 years Sonographer:          Rosaura Jones MARCIA Gender:            M                    Additional Staff:     Ariadna Student Height:            177.80 cm            Admit Date:           12/13/2023 Weight:            104.78 kg            Admission Status:     Inpatient -                                                               Routine BSA:               2.22 m2              Department Location:  [facility] Echo                                                               Lab Blood Pressure: 148 /87 mmHg Study Type:    TRANSTHORACIC ECHO (TTE) COMPLETE Diagnosis/ICD: Chronic systolic (congestive) heart failure (CHF)-I50.22 Indication:    Congestive Heart Failure CPT Codes:     Echo Complete w Full Doppler-53317  Study Detail: The following Echo studies were performed: 2D, M-Mode, Doppler and               color flow. Optison used as a contrast agent for endocardial               border definition. Total contrast used  for this procedure was 3 mL               via IV push.  PHYSICIAN INTERPRETATION: Left Ventricle: Left ventricular systolic function is mildly decreased, with an estimated ejection fraction of 40-45%. There is global hypokinesis of the left ventricle with minor regional variations. The left ventricular cavity size is mildly dilated. There is moderate concentric left ventricular hypertrophy. Spectral Doppler shows an impaired relaxation pattern of left ventricular diastolic filling. Left Atrium: The left atrium is normal in size. Right Ventricle: The right ventricle is normal in size. There is normal right ventricular global systolic function. Right Atrium: The right atrium is normal in size. Aortic Valve: The aortic valve is trileaflet. There is mild aortic valve cusp calcification. There is no evidence of aortic valve regurgitation. The peak instantaneous gradient of the aortic valve is 8.6 mmHg. The mean gradient of the aortic valve is 5.2 mmHg. Mitral Valve: The mitral valve is normal in structure. There is mild mitral annular calcification. There is trace mitral valve regurgitation. Tricuspid Valve: The tricuspid valve is structurally normal. There is trace tricuspid regurgitation. Pulmonic Valve: The pulmonic valve is structurally normal. There is no indication of pulmonic valve regurgitation. Pericardium: There is no pericardial effusion noted. Aorta: The aortic root is normal.  CONCLUSIONS:  1. Left ventricular systolic function is mildly decreased with a 40-45% estimated ejection fraction.  2. Spectral Doppler shows an impaired relaxation pattern of left ventricular diastolic filling.  3. There is moderate concentric left ventricular hypertrophy.  4. There is global hypokinesis of the left ventricle with minor regional variations. QUANTITATIVE DATA SUMMARY: 2D MEASUREMENTS:                           Normal Ranges: LAs:           4.59 cm    (2.7-4.0cm) IVSd:          1.40 cm    (0.6-1.1cm) LVPWd:         1.48  cm    (0.6-1.1cm) LVIDd:         5.82 cm    (3.9-5.9cm) LVIDs:         4.06 cm LV Mass Index: 173.4 g/m2 LV % FS        30.2 % LA VOLUME:                               Normal Ranges: LA Vol A4C:        50.2 ml    (22+/-6mL/m2) LA Vol A2C:        55.7 ml LA Vol BP:         54.2 ml LA Vol Index A4C:  22.6ml/m2 LA Vol Index A2C:  25.1 ml/m2 LA Vol Index BP:   24.4 ml/m2 LA Area A4C:       18.5 cm2 LA Area A2C:       20.0 cm2 LA Major Axis A4C: 5.8 cm LA Major Axis A2C: 6.1 cm LA Volume Index:   24.0 ml/m2 LA Vol A4C:        49.2 ml LA Vol A2C:        53.9 ml RA VOLUME BY A/L METHOD:                       Normal Ranges: RA Area A4C: 16.3 cm2 M-MODE MEASUREMENTS:                  Normal Ranges: Ao Root: 3.20 cm (2.0-3.7cm) AORTA MEASUREMENTS:                    Normal Ranges: Asc Ao, d: 3.40 cm (2.1-3.4cm) LV SYSTOLIC FUNCTION BY 2D PLANIMETRY (MOD):                     Normal Ranges: EF-A4C View: 35.1 % (>=55%) EF-A2C View: 53.5 % EF-Biplane:  40.3 % LV DIASTOLIC FUNCTION:                           Normal Ranges: MV Peak E:    0.48 m/s    (0.7-1.2 m/s) MV Peak A:    0.81 m/s    (0.42-0.7 m/s) E/A Ratio:    0.59        (1.0-2.2) MV e'         0.06 m/s    (>8.0) MV lateral e' 0.07 m/s MV medial e'  0.06 m/s MV A Dur:     128.45 msec E/e' Ratio:   7.34        (<8.0) MITRAL VALVE:                 Normal Ranges: MV DT: 226 msec (150-240msec) AORTIC VALVE:                                   Normal Ranges: AoV Vmax:                1.47 m/s (<=1.7m/s) AoV Peak P.6 mmHg (<20mmHg) AoV Mean P.2 mmHg (1.7-11.5mmHg) LVOT Max Paresh:            1.20 m/s (<=1.1m/s) AoV VTI:                 30.87 cm (18-25cm) LVOT VTI:                26.05 cm LVOT Diameter:           2.03 cm  (1.8-2.4cm) AoV Area, VTI:           2.72 cm2 (2.5-5.5cm2) AoV Area,Vmax:           2.63 cm2 (2.5-4.5cm2) AoV Dimensionless Index: 0.84  RIGHT VENTRICLE: RV Basal 3.58 cm RV Mid   3.10 cm RV Major 8.2 cm TAPSE:   27.7 mm RV s'    0.18  m/s TRICUSPID VALVE/RVSP:                             Normal Ranges: Peak TR Velocity: 2.58 m/s RV Syst Pressure: 29.7 mmHg (< 30mmHg) TV e'             0.1 m/s AORTA: Asc Ao Diam 3.39 cm  [id number] Roderick Cade DO Electronically signed on 12/13/2023 at 3:28:39 PM  ** Final **       Lab Results   Component Value Date    BUN 17 10/24/2024    CREATININE 1.08 10/24/2024    BNP 48 10/24/2024    MG 1.82 10/24/2024    K 4.1 10/24/2024     10/24/2024       Constitutional:       General:  NAD   HENT:      Head: Normocephalic     Mouth: Mucous membranes are moist.      Neck:  No JVD or HJR   Eyes:      Conjunctiva/sclera: Conjunctivae normal.    Cardiovascular:      Rate and Rhythm: Normal rate and regular rhythm     Heart sounds:  S1 S2 normal, no murmur, no S3 or S4   Pulmonary:      Pulmonary effort is normal.  Normal breath sounds  No wheezing or rales.   Abdominal:      General: Bowel sounds are normal, non- tender to palpitations. Liver is non palpable at  right costal margin.   Musculoskeletal:         General: No swelling   GANN well.   Skin:     General: Skin is warm and dry   Neurological:      General: No focal deficit present.      Mental Status: alert and oriented to person, place, and time. Mental status is at baseline.     Psychiatric:         Mood and Affect: Normal Affect.     Assessment/Plan     Problem List Items Addressed This Visit       Chronic systolic heart failure    Hypertension    Intermittent claudication (CMS-HCC)      Chronic  systolic heart failure  improved EF   Etiology non ischemic   AHA Stage: C   NYHA class: 2   Volume Status: Compensated   GFR: 73     GDMT:  BB- carvedilol 12.5 mg 1 tablet twice a day  ARB/ACEI/ARNI -  Entresto 97/103 mg 1 tablet twice a day   MRA - eplerenone 50 mg once a day   SGLT2i -  did not tolerate   Diuretic -  hydrochlorothiazide 25 mg daily   Device Therapy:  not needed due to improvement in EF    Hydralazine 50 mg 1 tablet three times a day.    Norvasc to 10 mg  once a day.      CHF:   He is taking medications as directed.  He has no sx of congestion.   He is feeling well.  We will try to obtain a copy of his most recent lab work.  My plan is to see him back in 6 to 8 weeks barring any problems in the meantime.     Emphasized salt restriction.  Encouraged daily monitoring of the patient's weight.  Encouraged regular exercise.  Get copy of PCP labs recently completed.   Follow up in  6 weeks.       2. HTN:   Patient has not had morning medications today.   Reports  mm hg      3.  Leg cramping and weakness:   Patient did not have PVR testing completed yet but has order from PCP to have this completed at [facility].         Jessica Hernandez, APRN-CNP

## 2024-12-19 ENCOUNTER — HOSPITAL ENCOUNTER (OUTPATIENT)
Dept: VASCULAR MEDICINE | Facility: HOSPITAL | Age: 74
Discharge: HOME | End: 2024-12-19
Payer: MEDICARE

## 2024-12-19 DIAGNOSIS — M79.89 OTHER SPECIFIED SOFT TISSUE DISORDERS: ICD-10-CM

## 2024-12-19 DIAGNOSIS — I73.9 PERIPHERAL VASCULAR DISEASE, UNSPECIFIED (CMS-HCC): ICD-10-CM

## 2024-12-19 PROCEDURE — 93970 EXTREMITY STUDY: CPT | Performed by: SURGERY

## 2024-12-19 PROCEDURE — 93970 EXTREMITY STUDY: CPT

## 2025-01-08 ENCOUNTER — APPOINTMENT (OUTPATIENT)
Dept: VASCULAR MEDICINE | Facility: HOSPITAL | Age: 75
End: 2025-01-08
Payer: MEDICARE

## 2025-01-10 ENCOUNTER — OFFICE VISIT (OUTPATIENT)
Dept: CARDIOLOGY | Facility: HOSPITAL | Age: 75
End: 2025-01-10
Payer: MEDICARE

## 2025-01-10 VITALS
BODY MASS INDEX: 32.67 KG/M2 | SYSTOLIC BLOOD PRESSURE: 182 MMHG | HEART RATE: 70 BPM | WEIGHT: 227.7 LBS | DIASTOLIC BLOOD PRESSURE: 96 MMHG | RESPIRATION RATE: 20 BRPM | OXYGEN SATURATION: 97 %

## 2025-01-10 DIAGNOSIS — I10 PRIMARY HYPERTENSION: ICD-10-CM

## 2025-01-10 DIAGNOSIS — I50.22 CHRONIC SYSTOLIC HEART FAILURE: Primary | ICD-10-CM

## 2025-01-10 PROCEDURE — 1125F AMNT PAIN NOTED PAIN PRSNT: CPT | Performed by: NURSE PRACTITIONER

## 2025-01-10 PROCEDURE — 3077F SYST BP >= 140 MM HG: CPT | Performed by: NURSE PRACTITIONER

## 2025-01-10 PROCEDURE — 3080F DIAST BP >= 90 MM HG: CPT | Performed by: NURSE PRACTITIONER

## 2025-01-10 PROCEDURE — 99213 OFFICE O/P EST LOW 20 MIN: CPT | Performed by: NURSE PRACTITIONER

## 2025-01-10 PROCEDURE — 1036F TOBACCO NON-USER: CPT | Performed by: NURSE PRACTITIONER

## 2025-01-10 RX ORDER — SACUBITRIL AND VALSARTAN 97; 103 MG/1; MG/1
1 TABLET, FILM COATED ORAL 2 TIMES DAILY
Qty: 180 TABLET | Refills: 3 | Status: SHIPPED | OUTPATIENT
Start: 2025-01-10 | End: 2026-01-10

## 2025-01-10 ASSESSMENT — ENCOUNTER SYMPTOMS
WEAKNESS: 0
ACTIVITY CHANGE: 0
FEVER: 0
CONFUSION: 0
DEPRESSION: 0
PALPITATIONS: 0
EYES NEGATIVE: 1
CHEST TIGHTNESS: 0
SHORTNESS OF BREATH: 0
LIGHT-HEADEDNESS: 0
BLOOD IN STOOL: 0
HEMATURIA: 0
LOSS OF SENSATION IN FEET: 0
CHILLS: 0
ABDOMINAL DISTENTION: 0
OCCASIONAL FEELINGS OF UNSTEADINESS: 0
WHEEZING: 0
COUGH: 0

## 2025-01-10 ASSESSMENT — PAIN SCALES - GENERAL: PAINLEVEL_OUTOF10: 3

## 2025-01-10 NOTE — PATIENT INSTRUCTIONS
Thank you for coming in today.  If you have any questions you may contact the office Monday through Friday at [phone] or on week ends at [phone].    Please take the medications as prescribed.   You need to restart the entresto.         Please follow  a 2 GM sodium diet and limit fluid intake to 2 liters per day or 8 servings ( serving size = 8 oz. = 1 cup = 240 ml) per day.   Please avoid processed meat products (luncheon meats, sausages, mccrary, hot dogs for example) eat 4 servings of vegetables and 1-2 whole servings of whole fruits per day.   Please weigh daily and call [phone] for weight gain of 3 pounds in 24 hours or 5 pounds or if you experience increased swelling or shortness of breath.         Follow up:  6-8 weeks    Please be sure to follow up with your cardiologist at [facility] once every year. Call [phone] to schedule appointment if you do not have a follow up appointment scheduled already.

## 2025-01-10 NOTE — PROGRESS NOTES
Subjective   Patient ID: Dragan Prince is a 74 y.o. male who presents for follow-up of congestive heart failure.     Current Outpatient Medications:     amLODIPine (Norvasc) 5 mg tablet, Take 2 tablets (10 mg) by mouth once daily., Disp: 90 tablet, Rfl: 3    aspirin 81 mg EC tablet, Take 1 tablet (81 mg) by mouth once daily., Disp: , Rfl:     carvedilol (Coreg) 12.5 mg tablet, Take 0.5 tablets (6.25 mg) by mouth 2 times a day with meals., Disp: 90 tablet, Rfl: 3    diclofenac (Voltaren) 75 mg EC tablet, Take 1 tablet (75 mg) by mouth 2 times a day. Do not crush, chew, or split., Disp: , Rfl:     eplerenone (Inspra) 50 mg tablet, Take 1 tablet (50 mg) by mouth once daily., Disp: 90 tablet, Rfl: 3    hydrALAZINE (Apresoline) 50 mg tablet, Take 1 tablet (50 mg) by mouth 3 times a day., Disp: 270 tablet, Rfl: 3    hydroCHLOROthiazide (HYDRODiuril) 25 mg tablet, Take 1 tablet (25 mg) by mouth once daily., Disp: 90 tablet, Rfl: 3    omeprazole (PriLOSEC) 20 mg DR capsule, Take 1 capsule (20 mg) by mouth once daily., Disp: , Rfl:     magnesium oxide (Mag-Ox) 400 mg tablet, Take 1 tablet (400 mg) by mouth once daily. (Patient not taking: Reported on 1/10/2025), Disp: 90 tablet, Rfl: 1    sacubitriL-valsartan (Entresto)  mg tablet, Take 1 tablet by mouth 2 times a day. (Patient not taking: Reported on 1/10/2025), Disp: 180 tablet, Rfl: 3     HPI   Past medical history of primary hypertension nonischemic cardiomyopathy, history of moderate MR, GERD.  There is a history of colon cancer with resection.  There is a history of obstructive sleep apnea.  Left heart cath in 2022 showed nonobstructive coronary disease.  Last echocardiogram was in 2023 and he had had improvement in ejection fraction from 20 to 25% to 40 to 45% at that time.  He is in the process of workup with his primary doctor to rule out PAD.  Last hospitalization was 12/2023.     He denies orthopnea PND or edema. He denies chest pain or palpitations no  focal neurological change dizziness near sina syncope.     Review of Systems   Constitutional:  Negative for activity change, chills and fever.   HENT:  Negative for hearing loss.    Eyes: Negative.    Respiratory:  Negative for cough, chest tightness, shortness of breath and wheezing.    Cardiovascular:  Negative for chest pain, palpitations and leg swelling.   Gastrointestinal:  Negative for abdominal distention and blood in stool.   Genitourinary:  Negative for hematuria.   Neurological:  Negative for syncope, weakness and light-headedness.   Psychiatric/Behavioral:  Negative for confusion.        Objective     BP (!) 182/96 (BP Location: Left arm, Patient Position: Sitting) Comment: Siddharth has not taken his medications yet this morning. Also he has been out of Entresto for a month.  Pulse 70   Resp 20   Wt 103 kg (227 lb 11.2 oz)   SpO2 97%   BMI 32.67 kg/m²     11/2023 Echocardiogram   CONCLUSIONS:   1. Left ventricular systolic function is mildly decreased with a 40-45% estimated ejection fraction.   2. Spectral Doppler shows an impaired relaxation pattern of left ventricular diastolic filling.   3. There is moderate concentric left ventricular hypertrophy.   4. There is global hypokinesis of the left ventricle with minor regional variations.    CPET 3/2024  1. [place] functional class C (moderate to severe impairment).   2. Probable cause of functional impairment: Cardiac.   3. RER > 1 indicates good effort.   4. Peak VO2 of 11.8 mL/kg/min and/or VE/VC02 of 27 indicates moderate risk of complications.   5. Maximize guideline directed medical therapy.   6. Repeat testing in future as clinically indicated.        Lab Results   Component Value Date    BUN 17 10/24/2024    CREATININE 1.08 10/24/2024    BNP 48 10/24/2024    MG 1.82 10/24/2024    K 4.1 10/24/2024     10/24/2024       Constitutional:       General:  NAD   HENT:      Head: Normocephalic     Mouth: Mucous membranes are moist.      Neck:  No JVD  or HJR   Eyes:      Conjunctiva/sclera: Conjunctivae normal.    Cardiovascular:      Rate and Rhythm: Normal rate and regular rhythm      Heart sounds:  S1 S2 normal, no murmur, no S3 or S4   Pulmonary:      Pulmonary effort is normal.  Normal breath sounds No wheezing or rales.   Abdominal:      General: Bowel sounds are normal, non- tender to palpitations. Liver is non palpable at  right costal margin.   Musculoskeletal:         General: No swelling  GANN well.   Skin:     General: Skin is warm and dry   Neurological:      General: No focal deficit present.      Mental Status: alert and oriented to person, place, and time. Mental status is at baseline.     Psychiatric:         Mood and Affect: Normal Affect.     Assessment/Plan     Problem List Items Addressed This Visit       Chronic systolic heart failure - Primary    Hypertension    Chronic  systolic heart failure  improved EF   Etiology non ischemic   AHA Stage: C   NYHA class: 2   Volume Status: Compensated   GFR: 73     GDMT:  BB- carvedilol 12.5 mg 1 tablet twice a day  ARB/ACEI/ARNI -  Entresto 97/103 mg 1 tablet twice a day   MRA - eplerenone 50 mg once a day   SGLT2i -  did not tolerate   Diuretic -  hydrochlorothiazide 25 mg daily   Device Therapy:  not needed due to improvement in EF    Hydralazine 50 mg 1 tablet three times a day.    Norvasc to 10 mg once a day.      CHF:   Euvolemic.   Patient reports being off Entresto X 1 month.  He just received approval for patient assistance and will be getting more within a few days.  He has not taken morning medications.   Feeling well. Will order repeat echocardiogram as last one was completed in 2023.    Will work toward optimizing medications.  Consider replacing adding SGLT2i and discontinuing the hydrochlorothiazide.       Emphasized salt restriction.  Encouraged daily monitoring of the patient's weight.  Encouraged regular exercise.  Follow up in  6 weeks.       2. HTN:   Patient has not had morning  medications today.  He also has been off the Entresto which he will restart hopefully within a few days.      3.  Leg cramping and weakness:   Venous duplex is negative.  Arterial duplex is pending.            Jessica Hernandez, JOSSELINE-CNP

## 2025-01-15 ENCOUNTER — TELEPHONE (OUTPATIENT)
Dept: INTERVENTIONAL RADIOLOGY/VASCULAR | Age: 75
End: 2025-01-15

## 2025-01-15 NOTE — TELEPHONE ENCOUNTER
Called patient to remind him of vascular ultrasound exams scheduled for 01/16/2025.  Patient already had venous ultrasound done at [place] and is scheduled for arterial ultrasound @  next week.  Patient cancelled testing for [place] and wished to follow at .  Advised patient to notify Dr. Man Blackmon.

## 2025-01-24 ENCOUNTER — HOSPITAL ENCOUNTER (OUTPATIENT)
Dept: CARDIOLOGY | Facility: HOSPITAL | Age: 75
Discharge: HOME | End: 2025-01-24
Payer: MEDICARE

## 2025-01-24 ENCOUNTER — HOSPITAL ENCOUNTER (OUTPATIENT)
Dept: VASCULAR MEDICINE | Facility: HOSPITAL | Age: 75
Discharge: HOME | End: 2025-01-24
Payer: MEDICARE

## 2025-01-24 DIAGNOSIS — I73.9 PERIPHERAL VASCULAR DISEASE, UNSPECIFIED (CMS-HCC): ICD-10-CM

## 2025-01-24 DIAGNOSIS — M79.604 PAIN IN RIGHT LEG: ICD-10-CM

## 2025-01-24 DIAGNOSIS — I10 PRIMARY HYPERTENSION: ICD-10-CM

## 2025-01-24 DIAGNOSIS — M79.605 PAIN IN LEFT LEG: ICD-10-CM

## 2025-01-24 DIAGNOSIS — I50.22 CHRONIC SYSTOLIC HEART FAILURE: ICD-10-CM

## 2025-01-24 PROCEDURE — 93925 LOWER EXTREMITY STUDY: CPT

## 2025-01-24 PROCEDURE — 2500000004 HC RX 250 GENERAL PHARMACY W/ HCPCS (ALT 636 FOR OP/ED): Performed by: STUDENT IN AN ORGANIZED HEALTH CARE EDUCATION/TRAINING PROGRAM

## 2025-01-24 PROCEDURE — C8929 TTE W OR WO FOL WCON,DOPPLER: HCPCS

## 2025-01-24 PROCEDURE — 93306 TTE W/DOPPLER COMPLETE: CPT | Performed by: STUDENT IN AN ORGANIZED HEALTH CARE EDUCATION/TRAINING PROGRAM

## 2025-01-24 RX ADMIN — HUMAN ALBUMIN MICROSPHERES AND PERFLUTREN 0.5 ML: 10; .22 INJECTION, SOLUTION INTRAVENOUS at 07:39

## 2025-01-25 LAB
AORTIC VALVE MEAN GRADIENT: 4 MMHG
AORTIC VALVE PEAK VELOCITY: 1.38 M/S
AV PEAK GRADIENT: 8 MMHG
AVA (PEAK VEL): 1.97 CM2
AVA (VTI): 1.85 CM2
EJECTION FRACTION APICAL 4 CHAMBER: 65.1
EJECTION FRACTION: 53 %
LEFT ATRIUM VOLUME AREA LENGTH INDEX BSA: 40.6 ML/M2
LEFT VENTRICLE INTERNAL DIMENSION DIASTOLE: 5.89 CM (ref 3.5–6)
LEFT VENTRICULAR OUTFLOW TRACT DIAMETER: 1.98 CM
LV EJECTION FRACTION BIPLANE: 59 %
MITRAL VALVE E/A RATIO: 0.62
MITRAL VALVE E/E' RATIO: 5.95
RIGHT VENTRICLE FREE WALL PEAK S': 17.1 CM/S
RIGHT VENTRICLE PEAK SYSTOLIC PRESSURE: 33.9 MMHG
TRICUSPID ANNULAR PLANE SYSTOLIC EXCURSION: 2.4 CM

## 2025-03-06 ENCOUNTER — OFFICE VISIT (OUTPATIENT)
Dept: CARDIOLOGY | Facility: HOSPITAL | Age: 75
End: 2025-03-06
Payer: MEDICARE

## 2025-03-06 VITALS
BODY MASS INDEX: 32.13 KG/M2 | WEIGHT: 223.9 LBS | SYSTOLIC BLOOD PRESSURE: 118 MMHG | OXYGEN SATURATION: 96 % | DIASTOLIC BLOOD PRESSURE: 67 MMHG | RESPIRATION RATE: 20 BRPM | HEART RATE: 68 BPM

## 2025-03-06 DIAGNOSIS — I50.22 CHRONIC SYSTOLIC HEART FAILURE: Primary | ICD-10-CM

## 2025-03-06 DIAGNOSIS — I10 PRIMARY HYPERTENSION: ICD-10-CM

## 2025-03-06 PROCEDURE — 1036F TOBACCO NON-USER: CPT | Performed by: NURSE PRACTITIONER

## 2025-03-06 PROCEDURE — 3074F SYST BP LT 130 MM HG: CPT | Performed by: NURSE PRACTITIONER

## 2025-03-06 PROCEDURE — 3078F DIAST BP <80 MM HG: CPT | Performed by: NURSE PRACTITIONER

## 2025-03-06 PROCEDURE — 99212 OFFICE O/P EST SF 10 MIN: CPT | Performed by: NURSE PRACTITIONER

## 2025-03-06 PROCEDURE — 1159F MED LIST DOCD IN RCRD: CPT | Performed by: NURSE PRACTITIONER

## 2025-03-06 ASSESSMENT — ENCOUNTER SYMPTOMS
WEAKNESS: 0
COUGH: 0
WHEEZING: 0
FEVER: 0
BLOOD IN STOOL: 0
ACTIVITY CHANGE: 0
CONFUSION: 0
LIGHT-HEADEDNESS: 0
CHILLS: 0
HEMATURIA: 0
EYES NEGATIVE: 1
ARTHRALGIAS: 1
SHORTNESS OF BREATH: 0
ABDOMINAL DISTENTION: 0
CHEST TIGHTNESS: 0
PALPITATIONS: 0

## 2025-03-06 NOTE — PATIENT INSTRUCTIONS
Thank you for coming in today.  If you have any questions you may contact the office Monday through Friday at [phone] or on week ends at [phone].    Continue current medications.     Please follow  a 2 GM sodium diet and limit fluid intake to 2 liters per day or 8 servings ( serving size = 8 oz. = 1 cup = 240 ml) per day.   Please avoid processed meat products (luncheon meats, sausages, mccrary, hot dogs for example) eat 4 servings of vegetables and 1-2 whole servings of whole fruits per day.   Please weigh daily and call [phone] for weight gain of 3 pounds in 24 hours or 5 pounds or if you experience increased swelling or shortness of breath.         Follow up:  3 months.     Please be sure to follow up with your cardiologist at [facility] once every year. Call [phone] to schedule appointment if you do not have a follow up appointment scheduled already.

## 2025-03-06 NOTE — PROGRESS NOTES
Subjective   Patient ID: Dragan Prince is a 74 y.o. male who presents for follow-up of congestive heart failure.     Current Outpatient Medications:     amLODIPine (Norvasc) 5 mg tablet, Take 2 tablets (10 mg) by mouth once daily., Disp: 90 tablet, Rfl: 3    aspirin 81 mg EC tablet, Take 1 tablet (81 mg) by mouth once daily., Disp: , Rfl:     carvedilol (Coreg) 12.5 mg tablet, Take 0.5 tablets (6.25 mg) by mouth 2 times a day with meals., Disp: 90 tablet, Rfl: 3    diclofenac (Voltaren) 75 mg EC tablet, Take 1 tablet (75 mg) by mouth 2 times a day. Do not crush, chew, or split., Disp: , Rfl:     eplerenone (Inspra) 50 mg tablet, Take 1 tablet (50 mg) by mouth once daily., Disp: 90 tablet, Rfl: 3    hydrALAZINE (Apresoline) 50 mg tablet, Take 1 tablet (50 mg) by mouth 3 times a day., Disp: 270 tablet, Rfl: 3    hydroCHLOROthiazide (HYDRODiuril) 25 mg tablet, Take 1 tablet (25 mg) by mouth once daily., Disp: 90 tablet, Rfl: 3    magnesium oxide (Mag-Ox) 400 mg tablet, Take 1 tablet (400 mg) by mouth once daily., Disp: 90 tablet, Rfl: 1    omeprazole (PriLOSEC) 20 mg DR capsule, Take 1 capsule (20 mg) by mouth once daily., Disp: , Rfl:     sacubitriL-valsartan (Entresto)  mg tablet, Take 1 tablet by mouth 2 times a day., Disp: 180 tablet, Rfl: 3     HPI     PMH presumed NICM EF 20-25% > 40-45%, restrictive diastolic filling, moderate MR, HTN, FANNY, colon CA s/p CTX, acalculous cholecystitis, GERD, arthritis.    Patient reports feeling well.  He reports no issues with medications.   He denies chest pain or edema.  No issues with sob unless going up or down steps for instance.         Review of Systems   Constitutional:  Negative for activity change, chills and fever.   HENT:  Negative for hearing loss.    Eyes: Negative.    Respiratory:  Negative for cough, chest tightness, shortness of breath and wheezing.    Cardiovascular:  Negative for chest pain, palpitations and leg swelling.   Gastrointestinal:  Negative  for abdominal distention and blood in stool.   Genitourinary:  Negative for hematuria.   Musculoskeletal:  Positive for arthralgias.        Ambulates with cane   Neurological:  Negative for syncope, weakness and light-headedness.   Psychiatric/Behavioral:  Negative for confusion.        Objective     /67 (BP Location: Left arm, Patient Position: Sitting, BP Cuff Size: Adult)   Pulse 68   Resp 20   Wt 102 kg (223 lb 14.4 oz)   SpO2 96%   BMI 32.13 kg/m²         Transthoracic echo (TTE) complete    Result Date: 1/25/2025              [place] [address]      Phone [phone] Fax [phone] TRANSTHORACIC ECHOCARDIOGRAM REPORT Patient Name:       QUENTIN De Guzman Physician:    [id number] Felix Win MD Study Date:         1/24/2025           Ordering Provider:    [id number] LOUIE HARLEY MRN/PID:            [id number]            Fellow: Accession#:         [id number]        Nurse:                Lilly Jacobo Date of Birth/Age:  1950 / 74      Sonographer:          Krissy Martinez RDCS                     years Gender Assigned at                     Additional Staff: Birth: Height:             175.26 cm           Admit Date:           1/24/2025 Weight:             102.97 kg           Admission Status:     Outpatient BSA / BMI:          2.18 m2 / 33.52     Department Location:  [facility] Echo                     kg/m2                                     Lab Blood Pressure: 182 /96 mmHg Study Type:    TRANSTHORACIC ECHO (TTE) COMPLETE Diagnosis/ICD: Chronic systolic (congestive) heart failure (CHF)-I50.22;                Essential (primary) hypertension-I10 Indication:    Hypertension, Congestive Heart Failure CPT Codes:     Echo Complete w Full Doppler-[id number]  Study Detail: The following Echo studies were performed: 2D,  M-Mode, Doppler and               color flow. Optison used as a contrast agent for endocardial               border definition. Total contrast used for this procedure was 3 mL               via IV push.  PHYSICIAN INTERPRETATION: Left Ventricle: Left ventricular ejection fraction is low normal, by visual estimate at 50-55%. There is severe eccentric left ventricular hypertrophy. There are no regional wall motion abnormalities. The left ventricular cavity size is mildly dilated. There is moderately increased septal and mildly increased posterior left ventricular wall thickness. Spectral Doppler shows a Grade I (impaired relaxation pattern) of left ventricular diastolic filling with normal left atrial filling pressure. Left Atrium: The left atrium is mildly dilated. Right Ventricle: The right ventricle is normal in size. There is normal right ventricular global systolic function. Right Atrium: The right atrium is normal in size. Aortic Valve: The aortic valve is trileaflet. The aortic valve dimensionless index is 0.61. There is no evidence of aortic valve regurgitation. The peak instantaneous gradient of the aortic valve is 8 mmHg. The mean gradient of the aortic valve is 4 mmHg. Mitral Valve: The mitral valve is normal in structure. There is trace mitral valve regurgitation. Tricuspid Valve: The tricuspid valve is structurally normal. There is trace tricuspid regurgitation. The Doppler estimated RVSP is slightly elevated right ventricular systolic pressure at 33.9 mmHg. Pulmonic Valve: The pulmonic valve is structurally normal. There is physiologic pulmonic valve regurgitation. Pericardium: No pericardial effusion noted. Aorta: The aortic root is normal. Systemic Veins: The inferior vena cava appears normal in size.  CONCLUSIONS:  1. Left ventricular ejection fraction is low normal, by visual estimate at 50-55%.  2. Spectral Doppler shows a Grade I (impaired relaxation pattern) of left ventricular diastolic filling  with normal left atrial filling pressure.  3. Left ventricular cavity size is mildly dilated.  4. There is normal right ventricular global systolic function.  5. Slightly elevated right ventricular systolic pressure.  6. There is moderately increased septal and mildly increased posterior left ventricular wall thickness. QUANTITATIVE DATA SUMMARY:  2D MEASUREMENTS:            Normal Ranges: IVSd:            1.71 cm    (0.6-1.1cm) LVPWd:           1.24 cm    (0.6-1.1cm) LVIDd:           5.89 cm    (3.9-5.9cm) LVIDs:           4.28 cm LV Mass Index:   185.7 g/m2 LV % FS          27.4 %  LA VOLUME:                    Normal Ranges: LA Vol A4C:        74.4 ml    (22+/-6mL/m2) LA Vol A2C:        101.9 ml LA Vol BP:         88.5 ml LA Vol Index A4C:  34.1 ml/m2 LA Vol Index A2C:  46.7 ml/m2 LA Vol Index BP:   40.6 ml/m2 LA Area A4C:       23.3 cm2 LA Area A2C:       27.7 cm2 LA Major Axis A4C: 6.2 cm LA Major Axis A2C: 6.4 cm LA Volume Index:   39.3 ml/m2 LA Vol A4C:        72.4 ml LA Vol A2C:        97.9 ml LA Vol Index BSA:  39.1 ml/m2  RA VOLUME BY A/L METHOD:          Normal Ranges: RA Area A4C:             18.5 cm2  M-MODE MEASUREMENTS:         Normal Ranges: Ao Root:             3.40 cm (2.0-3.7cm) AoV Exc:             1.50 cm (1.5-2.5cm) LAs:                 4.77 cm (2.7-4.0cm)  AORTA MEASUREMENTS:         Normal Ranges: AoV Exc:            1.50 cm (1.5-2.5cm) Ao Sinus, d:        2.90 cm (2.1-3.5cm) Ao STJ, d:          2.30 cm (1.7-3.4cm) Asc Ao, d:          2.70 cm (2.1-3.4cm)  LV SYSTOLIC FUNCTION BY 2D PLANIMETRY (MOD):                      Normal Ranges: EF-A4C View:    65 % (>=55%) EF-A2C View:    58 % EF-Biplane:     59 % EF-Visual:      53 % LV EF Reported: 53 %  LV DIASTOLIC FUNCTION:             Normal Ranges: MV Peak E:             0.48 m/s    (0.7-1.2 m/s) MV Peak A:             0.77 m/s    (0.42-0.7 m/s) E/A Ratio:             0.62        (1.0-2.2) MV e'                  0.081 m/s   (>8.0) MV lateral e'           0.08 m/s MV medial e'           0.08 m/s MV A Dur:              154.14 msec E/e' Ratio:            5.95        (<8.0)  MITRAL VALVE:          Normal Ranges: MV DT:        287 msec (150-240msec)  AORTIC VALVE:                     Normal Ranges: AoV Vmax:                1.38 m/s (<=1.7m/s) AoV Peak P.6 mmHg (<20mmHg) AoV Mean P.5 mmHg (1.7-11.5mmHg) LVOT Max Paresh:            0.91 m/s (<=1.1m/s) AoV VTI:                 28.64 cm (18-25cm) LVOT VTI:                17.61 cm LVOT Diameter:           1.98 cm  (1.8-2.4cm) AoV Area, VTI:           1.85 cm2 (2.5-5.5cm2) AoV Area,Vmax:           1.97 cm2 (2.5-4.5cm2) AoV Dimensionless Index: 0.61  RIGHT VENTRICLE: RV Basal 3.99 cm RV Mid   3.20 cm RV Major 7.7 cm TAPSE:   24.3 mm RV s'    0.17 m/s  TRICUSPID VALVE/RVSP:          Normal Ranges: Peak TR Velocity:     2.78 m/s RV Syst Pressure:     34 mmHg  (< 30mmHg) IVC Diam:             1.60 cm  AORTA: Asc Ao Diam 2.70 cm  [id number] Felix Win MD Electronically signed on 2025 at 1:07:05 PM  ** Final **       Lab Results   Component Value Date    BUN 17 10/24/2024    CREATININE 1.08 10/24/2024    BNP 48 10/24/2024    MG 1.82 10/24/2024    K 4.1 10/24/2024     10/24/2024       Constitutional:       General:  NAD   HENT:      Head: Normocephalic     Mouth: Mucous membranes are moist.      Neck:  No JVD or HJR   Eyes:      Conjunctiva/sclera: Conjunctivae normal.    Cardiovascular:      Rate and Rhythm: Normal rate and regular rhythm      Heart sounds:  S1 S2 normal, no murmur, no S3 or S4   Pulmonary:      Pulmonary effort is normal.  Normal breath sounds No wheezing or rales.   Abdominal:      General: Bowel sounds are normal, non- tender to palpitations.  Musculoskeletal:         General: No swelling   GANN well.   Skin:     General: Skin is warm and dry   Neurological:      General: No focal deficit present.      Mental Status: alert and oriented to person, place, and time.  Mental status is at baseline.     Psychiatric:         Mood and Affect: Normal Affect.     Assessment/Plan     Problem List Items Addressed This Visit       Chronic systolic heart failure    Hypertension      Chronic  systolic heart failure  improved EF   Etiology non ischemic   AHA Stage: C   NYHA class: 2   Volume Status: Compensated   GFR: 73     GDMT:  BB- carvedilol 6.25 mg 1 tablet twice a day  ARB/ACEI/ARNI -  Entresto 97/103 mg 1 tablet twice a day   MRA - eplerenone 50 mg once a day   SGLT2i -  did not tolerate   Diuretic - discontinued.   Device Therapy:  not needed due to improvement in EF    Hydralazine 50 mg 1 tablet three times a day.    Norvasc to 10 mg once a day.      CHF:   Appears euvolemic today.  He is on tolerated medical therapy.  Will continue current medications.  Follow up in clinic 3 months.    Emphasized salt restriction.  Encouraged daily monitoring of the patient's weight.  Encouraged regular exercise.  Get copy of PCP labs recently completed.   Follow up in  6 weeks.       2. HTN:   Patient has not had morning medications today.   Reports  mm hg          JOSSELINE Licea-CNP

## 2025-03-20 ENCOUNTER — TELEPHONE (OUTPATIENT)
Dept: CARDIOLOGY | Facility: HOSPITAL | Age: 75
End: 2025-03-20
Payer: COMMERCIAL

## 2025-03-20 NOTE — TELEPHONE ENCOUNTER
Patient called office asking to review if he qualifies for Medication Assistance for his sacubitriL-valsartan (Entresto)  mg tablet [[id number]] through [employer]. Patient asks to speak with the care team.

## 2025-03-26 DIAGNOSIS — I50.22 CHRONIC SYSTOLIC HEART FAILURE: Primary | ICD-10-CM

## 2025-04-01 ENCOUNTER — TELEPHONE (OUTPATIENT)
Dept: CARDIOLOGY | Facility: HOSPITAL | Age: 75
End: 2025-04-01
Payer: COMMERCIAL

## 2025-04-08 ENCOUNTER — APPOINTMENT (OUTPATIENT)
Dept: CARDIOLOGY | Facility: CLINIC | Age: 75
End: 2025-04-08
Payer: MEDICARE

## 2025-04-23 ENCOUNTER — APPOINTMENT (OUTPATIENT)
Dept: PHARMACY | Facility: HOSPITAL | Age: 75
End: 2025-04-23
Payer: COMMERCIAL

## 2025-04-24 ENCOUNTER — APPOINTMENT (OUTPATIENT)
Dept: CARDIOLOGY | Facility: HOSPITAL | Age: 75
End: 2025-04-24
Payer: COMMERCIAL

## 2025-05-05 ENCOUNTER — APPOINTMENT (OUTPATIENT)
Dept: PHARMACY | Facility: HOSPITAL | Age: 75
End: 2025-05-05
Payer: COMMERCIAL

## 2025-05-05 DIAGNOSIS — I50.22 CHRONIC SYSTOLIC HEART FAILURE: Primary | ICD-10-CM

## 2025-05-05 NOTE — PROGRESS NOTES
"  Pharmacist Clinic: Cardiology Management    Dragan Prince is a 74 y.o. male was referred to Clinical Pharmacy Team for heart failure management.     Referring Provider: Carlos Dia MD; Jessica Hernandez CNP    THIS IS A NEW PATIENT APPOINTMENT. PATIENT WILL BE ESTABLISHING CARE WITH CLINICAL PHARMACY.    Appointment was completed by patient who was reached at [phone].    REVIEW OF PAST APPNT (IF APPLICABLE):   N/a    Allergies Reviewed? Yes    Allergies[1]    Medical History[2]    Medications Ordered Prior to Encounter[3]      RELEVANT LAB RESULTS:  Lab Results   Component Value Date    BILITOT 1.0 12/02/2022    CALCIUM 9.6 10/24/2024    CO2 31 10/24/2024     10/24/2024    CREATININE 1.08 10/24/2024    GLUCOSE 97 10/24/2024    ALKPHOS 65 12/02/2022    K 4.1 10/24/2024    PROT 7.1 12/02/2022     10/24/2024    AST 15 12/02/2022    ALT 12 12/02/2022    BUN 17 10/24/2024    ANIONGAP 12 10/24/2024    MG 1.82 10/24/2024    ALBUMIN 4.1 12/02/2022    LIPASE 46 02/16/2022    GFRMALE 73 09/14/2023     Lab Results   Component Value Date    TRIG 119 10/06/2022    CHOL 220 (H) 10/06/2022    HDL 48.8 10/06/2022     No results found for: \"BMCBC\", \"CBCDIF\"     PHARMACEUTICAL ASSESSMENT:    MEDICATION RECONCILIATION    Was a medication reconciliation completed at this visit? Yes  Home Pharmacy Reviewed? Yes, describe: [facility] [place]    Added:  - none  Changed:  - none  Removed:  - none    Drug Interactions? No    Medication Documentation Review Audit       Reviewed by Sis Borja RN (Registered Nurse) on 03/06/25 at 0803      Medication Order Taking? Sig Documenting Provider Last Dose Status   amLODIPine (Norvasc) 5 mg tablet [id number] Yes Take 2 tablets (10 mg) by mouth once daily. MAVERICK Licea  Active   aspirin 81 mg EC tablet [id number] Yes Take 1 tablet (81 mg) by mouth once daily. Historical Provider, MD  Active   carvedilol (Coreg) 12.5 mg tablet [id number] Yes Take 0.5 tablets (6.25 mg) by " mouth 2 times a day with meals. Carlos Dia MD  Active   diclofenac (Voltaren) 75 mg EC tablet [id number] Yes Take 1 tablet (75 mg) by mouth 2 times a day. Do not crush, chew, or split. Historical Provider, MD  Active   eplerenone (Inspra) 50 mg tablet [id number] Yes Take 1 tablet (50 mg) by mouth once daily. Carlos Dia MD  Active   hydrALAZINE (Apresoline) 50 mg tablet [id number] Yes Take 1 tablet (50 mg) by mouth 3 times a day. MAVERICK Licea  Active   hydroCHLOROthiazide (HYDRODiuril) 25 mg tablet [id number] Yes Take 1 tablet (25 mg) by mouth once daily. Carlos Dia MD  Active   magnesium oxide (Mag-Ox) 400 mg tablet [id number] Yes Take 1 tablet (400 mg) by mouth once daily. MAVERICK Licea  Active   omeprazole (PriLOSEC) 20 mg DR capsule [id number] Yes Take 1 capsule (20 mg) by mouth once daily. Historical Provider, MD  Active   sacubitriL-valsartan (Entresto)  mg tablet [id number] Yes Take 1 tablet by mouth 2 times a day. MAVERICK Licea  Active                    DISEASE MANAGEMENT ASSESSMENT:     CHF ASSESSMENT     Symptom/Staging:  -Most recent ejection fraction: 50-55%  -NYHA Stage: II    Results for orders placed during the hospital encounter of 01/24/25    Transthoracic echo (TTE) [place] [address]  Phone [phone] Fax [phone]    TRANSTHORACIC ECHOCARDIOGRAM REPORT    Patient Name:       QUENTIN De Guzman Physician:    [id number] Felix Win MD  Study Date:         1/24/2025           Ordering Provider:    [id number] LOUIE HARLEY  MRN/PID:            [id number]            Fellow:  Accession#:         [id number]        Nurse:                Lilly Jacobo  Date of Birth/Age:  1950 / 74      Sonographer:          Krissy Martinez RDCS  years  Gender Assigned at  M                   Additional Staff:  Birth:  Height:             175.26 cm           Admit Date:            1/24/2025  Weight:             102.97 kg           Admission Status:     Outpatient  BSA / BMI:          2.18 m2 / 33.52     Department Location:  [facility] Echo  kg/m2                                     Lab  Blood Pressure: 182 /96 mmHg    Study Type:    TRANSTHORACIC ECHO (TTE) COMPLETE  Diagnosis/ICD: Chronic systolic (congestive) heart failure (CHF)-I50.22;  Essential (primary) hypertension-I10  Indication:    Hypertension, Congestive Heart Failure  CPT Codes:     Echo Complete w Full Doppler-35811  Study Detail: The following Echo studies were performed: 2D, M-Mode, Doppler and  color flow. Optison used as a contrast agent for endocardial  border definition. Total contrast used for this procedure was 3 mL  via IV push.      PHYSICIAN INTERPRETATION:  Left Ventricle: Left ventricular ejection fraction is low normal, by visual estimate at 50-55%. There is severe eccentric left ventricular hypertrophy. There are no regional wall motion abnormalities. The left ventricular cavity size is mildly dilated. There is moderately increased septal and mildly increased posterior left ventricular wall thickness. Spectral Doppler shows a Grade I (impaired relaxation pattern) of left ventricular diastolic filling with normal left atrial filling pressure.  Left Atrium: The left atrium is mildly dilated.  Right Ventricle: The right ventricle is normal in size. There is normal right ventricular global systolic function.  Right Atrium: The right atrium is normal in size.  Aortic Valve: The aortic valve is trileaflet. The aortic valve dimensionless index is 0.61. There is no evidence of aortic valve regurgitation. The peak instantaneous gradient of the aortic valve is 8 mmHg. The mean gradient of the aortic valve is 4 mmHg.  Mitral Valve: The mitral valve is normal in structure. There is trace mitral valve regurgitation.  Tricuspid Valve: The tricuspid valve is structurally normal. There is trace tricuspid regurgitation.  The Doppler estimated RVSP is slightly elevated right ventricular systolic pressure at 33.9 mmHg.  Pulmonic Valve: The pulmonic valve is structurally normal. There is physiologic pulmonic valve regurgitation.  Pericardium: No pericardial effusion noted.  Aorta: The aortic root is normal.  Systemic Veins: The inferior vena cava appears normal in size.      CONCLUSIONS:  1. Left ventricular ejection fraction is low normal, by visual estimate at 50-55%.  2. Spectral Doppler shows a Grade I (impaired relaxation pattern) of left ventricular diastolic filling with normal left atrial filling pressure.  3. Left ventricular cavity size is mildly dilated.  4. There is normal right ventricular global systolic function.  5. Slightly elevated right ventricular systolic pressure.  6. There is moderately increased septal and mildly increased posterior left ventricular wall thickness.    QUANTITATIVE DATA SUMMARY:    2D MEASUREMENTS:            Normal Ranges:  IVSd:            1.71 cm    (0.6-1.1cm)  LVPWd:           1.24 cm    (0.6-1.1cm)  LVIDd:           5.89 cm    (3.9-5.9cm)  LVIDs:           4.28 cm  LV Mass Index:   185.7 g/m2  LV % FS          27.4 %      LA VOLUME:                    Normal Ranges:  LA Vol A4C:        74.4 ml    (22+/-6mL/m2)  LA Vol A2C:        101.9 ml  LA Vol BP:         88.5 ml  LA Vol Index A4C:  34.1 ml/m2  LA Vol Index A2C:  46.7 ml/m2  LA Vol Index BP:   40.6 ml/m2  LA Area A4C:       23.3 cm2  LA Area A2C:       27.7 cm2  LA Major Axis A4C: 6.2 cm  LA Major Axis A2C: 6.4 cm  LA Volume Index:   39.3 ml/m2  LA Vol A4C:        72.4 ml  LA Vol A2C:        97.9 ml  LA Vol Index BSA:  39.1 ml/m2      RA VOLUME BY A/L METHOD:          Normal Ranges:  RA Area A4C:             18.5 cm2      M-MODE MEASUREMENTS:         Normal Ranges:  Ao Root:             3.40 cm (2.0-3.7cm)  AoV Exc:             1.50 cm (1.5-2.5cm)  LAs:                 4.77 cm (2.7-4.0cm)      AORTA MEASUREMENTS:         Normal  Ranges:  AoV Exc:            1.50 cm (1.5-2.5cm)  Ao Sinus, d:        2.90 cm (2.1-3.5cm)  Ao STJ, d:          2.30 cm (1.7-3.4cm)  Asc Ao, d:          2.70 cm (2.1-3.4cm)      LV SYSTOLIC FUNCTION BY 2D PLANIMETRY (MOD):  Normal Ranges:  EF-A4C View:    65 % (>=55%)  EF-A2C View:    58 %  EF-Biplane:     59 %  EF-Visual:      53 %  LV EF Reported: 53 %      LV DIASTOLIC FUNCTION:             Normal Ranges:  MV Peak E:             0.48 m/s    (0.7-1.2 m/s)  MV Peak A:             0.77 m/s    (0.42-0.7 m/s)  E/A Ratio:             0.62        (1.0-2.2)  MV e'                  0.081 m/s   (>8.0)  MV lateral e'          0.08 m/s  MV medial e'           0.08 m/s  MV A Dur:              154.14 msec  E/e' Ratio:            5.95        (<8.0)      MITRAL VALVE:          Normal Ranges:  MV DT:        287 msec (150-240msec)      AORTIC VALVE:                     Normal Ranges:  AoV Vmax:                1.38 m/s (<=1.7m/s)  AoV Peak P.6 mmHg (<20mmHg)  AoV Mean P.5 mmHg (1.7-11.5mmHg)  LVOT Max Paresh:            0.91 m/s (<=1.1m/s)  AoV VTI:                 28.64 cm (18-25cm)  LVOT VTI:                17.61 cm  LVOT Diameter:           1.98 cm  (1.8-2.4cm)  AoV Area, VTI:           1.85 cm2 (2.5-5.5cm2)  AoV Area,Vmax:           1.97 cm2 (2.5-4.5cm2)  AoV Dimensionless Index: 0.61      RIGHT VENTRICLE:  RV Basal 3.99 cm  RV Mid   3.20 cm  RV Major 7.7 cm  TAPSE:   24.3 mm  RV s'    0.17 m/s      TRICUSPID VALVE/RVSP:          Normal Ranges:  Peak TR Velocity:     2.78 m/s  RV Syst Pressure:     34 mmHg  (< 30mmHg)  IVC Diam:             1.60 cm      AORTA:  Asc Ao Diam 2.70 cm      [id number] Felix Win MD  Electronically signed on 2025 at 1:07:05 PM        ** Final **    Guideline-Directed Medical Therapy:  -ARNI: Yes, describe: Entresto - currently has been out of Entresto for few weeks  -Beta Blocker: Yes, describe: carvedilol IR  -MRA: Yes, describe: eplerenone  -SGLT2i: No    Secondary  Prevention:  -The 10-year ASCVD risk score (Stacey AYALA, et al., 2019) is: 24.6%    Values used to calculate the score:      Age: 74 years      Sex: Male      Is Non- : No      Diabetic: No      Tobacco smoker: No      Systolic Blood Pressure: 118 mmHg      Is BP treated: Yes      HDL Cholesterol: 48.8 mg/dL      Total Cholesterol: 220 mg/dL   -Aspirin 81mg? yes  -Statin?: No  -HTN?: Yes, describe: treated    CURRENT PHARMACOTHERAPY:   Carvedilol 12.5 mg PO BID  Eplerenone 50 mg PO daily    Entresto - has been out of medication since mid April, previously tolerated  mg PO BID    Affordability: cost is barrier, previously on [employer] PAP, but received recent denial, unsure why  Adherence/Compliance: no concerns w/ other medications  Adverse Effects: reports muscle cramps during night waking him up, from calf to hamstrings, relived by walking/stretching and consuming spoonful of mustard, reports headaches recently when taking medications    Monitoring Weights at Home: No  Home Weight Recordings: N/A    Past In Office Weight Readings:   Wt Readings from Last 6 Encounters:   03/06/25 102 kg (223 lb 14.4 oz)   01/10/25 103 kg (227 lb 11.2 oz)   11/14/24 102 kg (225 lb 1.6 oz)   08/09/24 103 kg (227 lb 9.6 oz)   06/21/24 104 kg (229 lb 1.6 oz)   04/05/24 103 kg (227 lb)       Monitoring Blood Pressure at Home: Yes - periodically, has not recently  Home BP Recordings: not available    Past In Office BP Readings:   BP Readings from Last 6 Encounters:   03/06/25 118/67   01/10/25 (!) 182/96   11/14/24 153/71   08/09/24 143/79   06/21/24 (!) 166/94   04/05/24 162/86     HEALTH MANAGEMENT    Maintaining fluid restriction (<2 L/day): Yes  Edema/swelling: No  Shortness of breath: No  Trouble sleeping/lying down: No  Dry/hacking cough: No  Recent Hospitalizations: No    EDUCATION/COUNSELING:   - Counseled patient on MOA, expectations, duration of therapy, contraindications, administration, and  monitoring parameters  - Counseled patient on lifestyle modifications that can decrease your risk of having complications (smoking cessation, losing weight, daily weights, vaccines)  - Counseled patient on fluid intake and weight management. Recommended to not consume more than 2 liters of fliuids per day. If they have gained more than 2-3 pounds within a 24 hours period (or 5 pounds in a week), contact their cardiologist  - Answered all patient questions and concerns     DISCUSSION/NOTES:   Initial visit w/ clinical pharmacy. Cost/inaccessibility to medication (Entresto) biggest concern. Reports has been out of Entresto since approx mid April 2025. Previously tolerated dose of  mg PO BID; however, was taking 1/2 to one tab daily to stretch out medication supply.  Adverse effects/S/Sx  Muscle cramps during night - relieved by stretching/spoonful of mustard. Mustard may help d/t vitamin/mineral content. Encouraged pt to continue adequate hydration w/in fluid restriction parameters as well as vitamin/mineral balance from food/supplements.  Headaches - reports shortly after taking AM doses of medications. Has not been monitoring BP.   Pt not currently monitoring BP - recommended monitoring once daily or minimally when having s/sx, especially headaches. To contact provider or clinical pharmacy w/ any concerning BP numbers.  Counseled pt we will need to restart at lower dose and plan to titrate back to previous dose as tolerated. Plan to start at 49-51 mg PO BID x minimum 2 weeks, reevaluate at next cardio appt on 5/30/25.   Screen for  VAF PAP   Patient Assistance Program (PAP)    Application for program to be submitted for the following medications: Entresto    Prescription Insurance:  Yes   Paid Test Claim:  Yes   County  Permanent Address:  [place]   Members of Household:  2   Files Taxes:  Yes     Patient will be dropping of financial paperwork at  [facility]. Or 's office    Patient  verbally reports monthly or yearly income which is less than 400% federal poverty level    Patient aware this process may take up to 6 weeks.     If approved medication must be filled through [facility] and MEDICATION WILL BE MAILED TO PATIENT.      ASSESSMENT:    Assessment/Plan   Problem List Items Addressed This Visit       Chronic systolic heart failure    Plan to restart Entresto at 49/51 mg PO BID and titrate to highest tolerated dose. No dose adjustment based on renal function warranted at this time.          RECOMMENDATIONS/PLAN:    Once approved for [facility] PAP - restart Entresto at 49-51 mg PO BID   At next cardio appt 5/30/25, recommend evaluation for titration to previously tolerated dose of Entresto  mg PO BID    Last Appnt with Referring Provider: 3/6/25  Next Appnt with Referring Provider: 5/30/25  Clinical Pharmacist follow up: 2 weeks after cardio appt  VAF/Application Expiration: Pending  Type of Encounter: In-person    Melissa Harley PharmD    Verbal consent to manage patient's drug therapy was obtained from the patient . They were informed they may decline to participate or withdraw from participation in pharmacy services at any time.    Continue all meds under the continuation of care with the referring provider and clinical pharmacy team.           [1]   Allergies  Allergen Reactions    Morphine Drowsiness and Dizziness     BP drops change of mental status     Pass out    Opioids - Morphine Analogues Drowsiness    Spironolactone Unknown     Breast tenderness   [2]   Past Medical History:  Diagnosis Date    Bipolar disorder, current episode manic without psychotic features, unspecified (Multi)     Bipolar disorder, current episode manic without psychotic features    Other conditions influencing health status     Arthritis    Personal history of other diseases of male genital organs     History of benign prostatic hypertrophy    Personal history of other diseases of the circulatory  system     History of hypertension    Personal history of other diseases of the digestive system     History of esophageal reflux    Personal history of other diseases of the musculoskeletal system and connective tissue     History of low back pain    Personal history of other diseases of the nervous system and sense organs     History of sleep apnea    Personal history of other malignant neoplasm of large intestine 05/13/2022    History of malignant neoplasm of colon    Personal history of other mental and behavioral disorders     History of depression   [3]   Current Outpatient Medications on File Prior to Visit   Medication Sig Dispense Refill    amLODIPine (Norvasc) 5 mg tablet Take 2 tablets (10 mg) by mouth once daily. 90 tablet 3    aspirin 81 mg EC tablet Take 1 tablet (81 mg) by mouth once daily.      eplerenone (Inspra) 50 mg tablet Take 1 tablet (50 mg) by mouth once daily. 90 tablet 3    hydrALAZINE (Apresoline) 50 mg tablet Take 1 tablet (50 mg) by mouth 3 times a day. 270 tablet 3    omeprazole (PriLOSEC) 20 mg DR capsule Take 1 capsule (20 mg) by mouth once daily.      sacubitriL-valsartan (Entresto)  mg tablet Take 1 tablet by mouth 2 times a day. 180 tablet 3    carvedilol (Coreg) 12.5 mg tablet Take 0.5 tablets (6.25 mg) by mouth 2 times a day with meals. (Patient taking differently: Take 1 tablet (12.5 mg) by mouth 2 times daily (morning and late afternoon).) 90 tablet 3    diclofenac (Voltaren) 75 mg EC tablet Take 1 tablet (75 mg) by mouth 2 times a day. Do not crush, chew, or split. (Patient not taking: Reported on 5/5/2025)      magnesium oxide (Mag-Ox) 400 mg tablet Take 1 tablet (400 mg) by mouth once daily. 90 tablet 1     No current facility-administered medications on file prior to visit.

## 2025-05-05 NOTE — Clinical Note
Initial visit w/ Clinical Pharmacy completed. Pt should qualify for [facility] PAP, application submitted. As pt has been off Entresto for a few weeks, will restart at lower dose of 49-51 mg PO BID x minimum of 2 weeks.  Anticipate will have medication on/around 5/19 5/30/25 - next appt w/ cardio, if pt tolerating well and ready to titrate, please have pt double up on doses and let me know so I can send the updated dose to the pharmacy 6/13/25 - follow up w/ clinical pharmacy scheduled to evaluate

## 2025-05-08 RX ORDER — SACUBITRIL AND VALSARTAN 49; 51 MG/1; MG/1
1 TABLET, FILM COATED ORAL 2 TIMES DAILY
Qty: 60 TABLET | Refills: 1 | Status: SHIPPED | OUTPATIENT
Start: 2025-05-08

## 2025-05-08 NOTE — ASSESSMENT & PLAN NOTE
Plan to restart Entresto at 49/51 mg PO BID and titrate to highest tolerated dose. No dose adjustment based on renal function warranted at this time.

## 2025-05-14 PROCEDURE — RXMED WILLOW AMBULATORY MEDICATION CHARGE

## 2025-05-16 ENCOUNTER — PHARMACY VISIT (OUTPATIENT)
Dept: PHARMACY | Facility: CLINIC | Age: 75
End: 2025-05-16
Payer: COMMERCIAL

## 2025-05-29 NOTE — PROGRESS NOTES
"Chief Complaint:   No chief complaint on file.     History Of Present Illness:    Dragan Prince is a 75 y.o. male presenting for yearly visit.  H/o presumed NICM EF 20-25% > 40-45%, restrictive diastolic filling, moderate MR, HTN, FANNY, colon CA s/p CTX 2023, acalculous cholecystitis, GERD, arthritis.    Last seen by me in 4/2024. At the time, we reviewed his hospitalization in 12/2023 for COVID-19 pneumonia, ARELI chronic systolic CHF compensated. Pt was feeling well since then, denied cardiac complaints.  He had also just started amlodipine 5mg daily, ordered by PCP. He updated his TTE in 1/2025, LVEF 50-55%, Grade I of LV diastolic filling, moderately increased septal and mildly increased posterior left ventricular wall thickness.    Today, he c/o chest pressure onset 1.5 months ago. This is a/w SOB \"all the time\" and \"physically and mentally exhausted,\" has night sweats. He tries to run daily. We reviewed last TTE 1/2025 which showed improvement. He was told he had bronchitis per imaging by another provider 1 month ago. He had severe allergies earlier this year which was new to him. When he walks at a reasonable pace, he gets tired. He forgot to mention his to PCP. Nonsmoker, no etoh use. His BP is elevated in office today. He states he had just taken his BP prior to this visit. He checks his BP at home occasionally and reports it \"always fluctuates.\" He has been taking amlodipine 1 tab - reviewed his script instructed to take 2 tabs for 10 mg daily. He cannot afford Entresto through [facility].     Of note, Pt eventually states he was out of Entresto for about 1 month and this was refilled at half the dose.     ROS:  The remainder of the review of systems was obtained, as was negative as pertains to the chief complaint.    CV testing and labs reviewed:  EKG today personally reviewed and demonstrated SB, HR 56 bpm, PVCs     Labs 10/2024:  K 4.1  BUN 17  Cr 1.08  Mg 1.82  Lipid labs 10/2022:    HDL 48.8    " "    TTE 1/2025:     1. Left ventricular ejection fraction is low normal, by visual estimate at 50-55%.   2. Spectral Doppler shows a Grade I (impaired relaxation pattern) of left ventricular diastolic filling with normal left atrial filling pressure.   3. Left ventricular cavity size is mildly dilated.   4. There is normal right ventricular global systolic function.   5. Slightly elevated right ventricular systolic pressure.   6. There is moderately increased septal and mildly increased posterior left ventricular wall thickness.    Cardiopulmonary  stress test 3/2024  1. Parham functional class C (moderate to severe impairment).   2. Probable cause of functional impairment: Cardiac.   3. RER > 1 indicates good effort.   4. Peak VO2 of 11.8 mL/kg/min and/or VE/VC02 of 27 indicates moderate risk of complications.   5. Maximize guideline directed medical therapy.   6. Repeat testing in future as clinically indicated.    TTE 12/2023  EF 40-45% global hypokinesis , moderate conc LVH, stage I DD, trace mitral , tricuspid valve regurg     Lipid labs 11/2022  chol 220, HDL 48.8    trig 119    L&RHC 5/2022  CONCLUSIONS:   1. RHC:RA: 5      RV: 28/5      PA: 28/12      PWP: 12      CO: 8.0 CI: 3.6.  2. Co-dominant coronary anatomy.  3. Mild non-onbstructive CAD.  4. Normal LVEDP.  5. No evidence of aortic stenosis on pullback.    Prior hx:  The patient was hospitalized in 2/22 with dyspnea, chest discomfort, and abodminal pain. He has known LV dysfunction and had prior care at [facility], [place] in [place], and at [facility]. He has undergone prior heart catheterization and does not have any stents. RUQ ultrasound demonstrated gallbladder thickening. He was diagnosed with acalculous cholecystitis. He was diuresed with IV lasix.     Last Recorded Vitals:  Vitals:    05/30/25 0909   BP: 154/78   Pulse: 56   Weight: 103 kg (227 lb 12.8 oz)   Height: 1.778 m (5' 10\")       Past Medical History:  He has a " past medical history of Bipolar disorder, current episode manic without psychotic features, unspecified (Multi), Other conditions influencing health status, Personal history of other diseases of male genital organs, Personal history of other diseases of the circulatory system, Personal history of other diseases of the digestive system, Personal history of other diseases of the musculoskeletal system and connective tissue, Personal history of other diseases of the nervous system and sense organs, Personal history of other malignant neoplasm of large intestine (05/13/2022), and Personal history of other mental and behavioral disorders.    Past Surgical History:  He has a past surgical history that includes Tonsillectomy (08/20/2013) and Colon surgery (09/01/2022).      Social History:  He reports that he has never smoked. He has never used smokeless tobacco. He reports that he does not drink alcohol and does not use drugs.    Family History:  No family history on file.     Allergies:  Morphine, Opioids - morphine analogues, and Spironolactone    Outpatient Medications:  Current Outpatient Medications   Medication Instructions    amLODIPine (NORVASC) 10 mg, oral, Daily    aspirin 81 mg, Daily    carvedilol (COREG) 6.25 mg, oral, 2 times daily (morning and late afternoon)    diclofenac (VOLTAREN) 75 mg, 2 times daily    eplerenone (INSPRA) 50 mg, oral, Daily    hydrALAZINE (APRESOLINE) 50 mg, oral, 3 times daily    magnesium oxide (MAG-OX) 400 mg, oral, Daily    omeprazole (PriLOSEC) 20 mg DR capsule 1 capsule, Daily    sacubitriL-valsartan (Entresto) 49-51 mg tablet 1 tablet, oral, 2 times daily       Physical Exam:  Physical Exam  HENT:      Head: Normocephalic.      Nose: Nose normal.      Mouth/Throat:      Mouth: Mucous membranes are moist.   Cardiovascular:      Rate and Rhythm: Normal rate and regular rhythm.      Comments: No carotid bruits,   Bilateral trace leg swelling   Pulmonary:      Effort: Pulmonary  effort is normal.      Breath sounds: Normal breath sounds.   Abdominal:      Palpations: Abdomen is soft.   Musculoskeletal:      Cervical back: Normal range of motion.   Skin:     General: Skin is warm and dry.   Neurological:      General: No focal deficit present.      Mental Status: He is alert.   Psychiatric:         Mood and Affect: Mood normal.            Last Labs:  CBC -  Lab Results   Component Value Date    WBC 8.2 06/21/2024    HGB 14.3 06/21/2024    HCT 42.5 06/21/2024    MCV 89 06/21/2024     06/21/2024       CMP -  Lab Results   Component Value Date    CALCIUM 9.6 10/24/2024    PROT 7.1 12/02/2022    ALBUMIN 4.1 12/02/2022    AST 15 12/02/2022    ALT 12 12/02/2022    ALKPHOS 65 12/02/2022    BILITOT 1.0 12/02/2022       LIPID PANEL -   Lab Results   Component Value Date    CHOL 220 (H) 10/06/2022    TRIG 119 10/06/2022    HDL 48.8 10/06/2022    CHHDL 4.5 10/06/2022    LDLF 147 (H) 10/06/2022    VLDL 24 10/06/2022       RENAL FUNCTION PANEL -   Lab Results   Component Value Date    GLUCOSE 97 10/24/2024     10/24/2024    K 4.1 10/24/2024     10/24/2024    CO2 31 10/24/2024    ANIONGAP 12 10/24/2024    BUN 17 10/24/2024    CREATININE 1.08 10/24/2024    GFRMALE 73 09/14/2023    CALCIUM 9.6 10/24/2024    ALBUMIN 4.1 12/02/2022        Lab Results   Component Value Date    BNP 48 10/24/2024     Assessment/Plan   NICM EF 20-25% > 40-45%  Restrictive diastolic filling  Moderate MR  HTN  FANNY - noncompliant with CPAP    Etiology non ischemic   AHA Stage: C   NYHA class: 2  Volume Status:  euvolemic   GFR: due for labs     GDMT:  BB- carvedilol 12.5 mg 1 tablet twice a day   -- HR is 60 BPM ***  ARB/ACEI/ARNI -  Entresto 97/103 mg 1 tablet twice a day   MRA - eplerenone 50 mg once a day   SGLT2i -  did not tolerate   Diuretic -  discontinued hydrochlorothiazide   Device Therapy:  not needed due to improvement in EF    Hydralazine 50 mg 1 tablet twice   Norvasc to 10 mg daily.     Of note, Pt  states he was out of Entresto for about 1 month and this was refilled at half the dose - this may be causing his current sxs of SOB, chest pressure, fatigue.    -refilled medications and instructed to take them as prescribed  -sent Entresto 97/103 mg once daily - this may be   -salt restriction.  -encouraged daily monitoring of the patient's weight - good weight seems to be ~ 227-230 lbs  -encouraged regular exercise.  -FU with Catarina as scheduled    HTN:    Today's /78 > rpt BP reasonable  -instructed to take amlodipine 10mg daily (2 tabs of 5 mg daily) as prescribed  -advised monitoring home BP's     Scribe Attestation  By signing my name below, I, Mary Jane Gold, Scribe   attest that this documentation has been prepared under the direction and in the presence of Carlos Dia MD.

## 2025-05-30 ENCOUNTER — APPOINTMENT (OUTPATIENT)
Dept: CARDIOLOGY | Facility: HOSPITAL | Age: 75
End: 2025-05-30
Payer: COMMERCIAL

## 2025-05-30 VITALS
HEART RATE: 56 BPM | BODY MASS INDEX: 32.61 KG/M2 | DIASTOLIC BLOOD PRESSURE: 76 MMHG | HEIGHT: 70 IN | WEIGHT: 227.8 LBS | SYSTOLIC BLOOD PRESSURE: 138 MMHG

## 2025-05-30 DIAGNOSIS — I10 HYPERTENSION, UNSPECIFIED TYPE: ICD-10-CM

## 2025-05-30 DIAGNOSIS — R06.02 SHORTNESS OF BREATH: ICD-10-CM

## 2025-05-30 DIAGNOSIS — I50.22 CHRONIC SYSTOLIC HEART FAILURE: ICD-10-CM

## 2025-05-30 DIAGNOSIS — I50.22 CHRONIC SYSTOLIC HEART FAILURE: Primary | ICD-10-CM

## 2025-05-30 DIAGNOSIS — R07.89 CHEST TIGHTNESS: ICD-10-CM

## 2025-05-30 DIAGNOSIS — R00.1 BRADYCARDIA: ICD-10-CM

## 2025-05-30 DIAGNOSIS — R07.89 CHEST TIGHTNESS: Primary | ICD-10-CM

## 2025-05-30 LAB
ATRIAL RATE: 56 BPM
P AXIS: 73 DEGREES
P OFFSET: 156 MS
P ONSET: 115 MS
PR INTERVAL: 208 MS
Q ONSET: 219 MS
QRS COUNT: 9 BEATS
QRS DURATION: 116 MS
QT INTERVAL: 430 MS
QTC CALCULATION(BAZETT): 414 MS
QTC FREDERICIA: 420 MS
R AXIS: 2 DEGREES
T AXIS: 65 DEGREES
T OFFSET: 434 MS
VENTRICULAR RATE: 56 BPM

## 2025-05-30 PROCEDURE — 99215 OFFICE O/P EST HI 40 MIN: CPT | Performed by: INTERNAL MEDICINE

## 2025-05-30 PROCEDURE — 99215 OFFICE O/P EST HI 40 MIN: CPT | Mod: 25 | Performed by: INTERNAL MEDICINE

## 2025-05-30 PROCEDURE — 93005 ELECTROCARDIOGRAM TRACING: CPT | Performed by: INTERNAL MEDICINE

## 2025-05-30 PROCEDURE — RXMED WILLOW AMBULATORY MEDICATION CHARGE

## 2025-05-30 PROCEDURE — 3078F DIAST BP <80 MM HG: CPT | Performed by: INTERNAL MEDICINE

## 2025-05-30 PROCEDURE — 1159F MED LIST DOCD IN RCRD: CPT | Performed by: INTERNAL MEDICINE

## 2025-05-30 PROCEDURE — 3075F SYST BP GE 130 - 139MM HG: CPT | Performed by: INTERNAL MEDICINE

## 2025-05-30 RX ORDER — SACUBITRIL AND VALSARTAN 49; 51 MG/1; MG/1
1 TABLET, FILM COATED ORAL 2 TIMES DAILY
Qty: 180 TABLET | Refills: 3 | Status: SHIPPED | OUTPATIENT
Start: 2025-05-30 | End: 2025-05-30 | Stop reason: WASHOUT

## 2025-05-30 RX ORDER — EPLERENONE 50 MG/1
50 TABLET ORAL DAILY
Qty: 90 TABLET | Refills: 3 | Status: SHIPPED | OUTPATIENT
Start: 2025-05-30 | End: 2026-05-30

## 2025-05-30 RX ORDER — AMLODIPINE BESYLATE 5 MG/1
5 TABLET ORAL DAILY
Qty: 90 TABLET | Refills: 3 | Status: SHIPPED | OUTPATIENT
Start: 2025-05-30 | End: 2026-05-30

## 2025-05-30 RX ORDER — CARVEDILOL 12.5 MG/1
12.5 TABLET ORAL
Qty: 180 TABLET | Refills: 3 | Status: SHIPPED | OUTPATIENT
Start: 2025-05-30 | End: 2026-05-30

## 2025-05-30 RX ORDER — EPLERENONE 50 MG/1
50 TABLET ORAL DAILY
Qty: 90 TABLET | Refills: 3 | Status: SHIPPED | OUTPATIENT
Start: 2025-05-30 | End: 2025-05-30

## 2025-05-30 RX ORDER — HYDRALAZINE HYDROCHLORIDE 50 MG/1
50 TABLET, FILM COATED ORAL 3 TIMES DAILY
Qty: 270 TABLET | Refills: 3 | Status: SHIPPED | OUTPATIENT
Start: 2025-05-30 | End: 2026-05-30

## 2025-05-30 NOTE — PATIENT INSTRUCTIONS
Thanks for following up in office today.    1)  We will schedule you for a treadmill nuclear stress test to check for heart blockages that may be causing your symptoms.     2)  If you are still short of breath and your PCP has not found noncardiac causes, we can consider repeating a heart catheterization.    3)  Please continue your cardiac medications as prescribed. I refilled your medications today.     4)  I sent a script for Entresto at its correct dose - this may be explaining some of your symptoms.     Follow up with Catarina as scheduled in June  If you have any questions, please call us at [phone]

## 2025-06-02 ENCOUNTER — PHARMACY VISIT (OUTPATIENT)
Dept: PHARMACY | Facility: CLINIC | Age: 75
End: 2025-06-02
Payer: COMMERCIAL

## 2025-06-04 ENCOUNTER — HOSPITAL ENCOUNTER (OUTPATIENT)
Dept: RADIOLOGY | Facility: HOSPITAL | Age: 75
Discharge: HOME | End: 2025-06-04
Payer: MEDICARE

## 2025-06-04 ENCOUNTER — HOSPITAL ENCOUNTER (OUTPATIENT)
Dept: CARDIOLOGY | Facility: HOSPITAL | Age: 75
Discharge: HOME | End: 2025-06-04
Payer: MEDICARE

## 2025-06-04 DIAGNOSIS — I50.22 CHRONIC SYSTOLIC HEART FAILURE: ICD-10-CM

## 2025-06-04 DIAGNOSIS — I50.9 CONGESTIVE HEART FAILURE, UNSPECIFIED HF CHRONICITY, UNSPECIFIED HEART FAILURE TYPE: Primary | ICD-10-CM

## 2025-06-04 DIAGNOSIS — I10 HYPERTENSION, UNSPECIFIED TYPE: ICD-10-CM

## 2025-06-04 DIAGNOSIS — R07.89 CHEST TIGHTNESS: ICD-10-CM

## 2025-06-04 PROCEDURE — 93017 CV STRESS TEST TRACING ONLY: CPT

## 2025-06-04 PROCEDURE — 2500000004 HC RX 250 GENERAL PHARMACY W/ HCPCS (ALT 636 FOR OP/ED): Performed by: INTERNAL MEDICINE

## 2025-06-04 PROCEDURE — 3430000001 HC RX 343 DIAGNOSTIC RADIOPHARMACEUTICALS: Performed by: INTERNAL MEDICINE

## 2025-06-04 PROCEDURE — A9502 TC99M TETROFOSMIN: HCPCS | Performed by: INTERNAL MEDICINE

## 2025-06-04 PROCEDURE — 78452 HT MUSCLE IMAGE SPECT MULT: CPT

## 2025-06-04 PROCEDURE — 93016 CV STRESS TEST SUPVJ ONLY: CPT | Performed by: INTERNAL MEDICINE

## 2025-06-04 PROCEDURE — 93018 CV STRESS TEST I&R ONLY: CPT | Performed by: INTERNAL MEDICINE

## 2025-06-04 PROCEDURE — 78452 HT MUSCLE IMAGE SPECT MULT: CPT | Performed by: INTERNAL MEDICINE

## 2025-06-04 RX ORDER — REGADENOSON 0.08 MG/ML
0.4 INJECTION, SOLUTION INTRAVENOUS ONCE
Status: COMPLETED | OUTPATIENT
Start: 2025-06-04 | End: 2025-06-04

## 2025-06-04 RX ADMIN — TETROFOSMIN 11.4 MILLICURIE: 0.23 INJECTION, POWDER, LYOPHILIZED, FOR SOLUTION INTRAVENOUS at 07:20

## 2025-06-04 RX ADMIN — REGADENOSON 0.4 MG: 0.08 INJECTION, SOLUTION INTRAVENOUS at 09:07

## 2025-06-04 RX ADMIN — TETROFOSMIN 34.6 MILLICURIE: 0.23 INJECTION, POWDER, LYOPHILIZED, FOR SOLUTION INTRAVENOUS at 08:55

## 2025-06-12 ENCOUNTER — OFFICE VISIT (OUTPATIENT)
Dept: CARDIOLOGY | Facility: HOSPITAL | Age: 75
End: 2025-06-12
Payer: MEDICARE

## 2025-06-12 VITALS
OXYGEN SATURATION: 96 % | SYSTOLIC BLOOD PRESSURE: 130 MMHG | RESPIRATION RATE: 20 BRPM | DIASTOLIC BLOOD PRESSURE: 70 MMHG | BODY MASS INDEX: 32.11 KG/M2 | WEIGHT: 223.8 LBS | HEART RATE: 60 BPM

## 2025-06-12 DIAGNOSIS — I50.22 CHRONIC SYSTOLIC HEART FAILURE: Primary | ICD-10-CM

## 2025-06-12 PROCEDURE — 3078F DIAST BP <80 MM HG: CPT | Performed by: NURSE PRACTITIONER

## 2025-06-12 PROCEDURE — 1126F AMNT PAIN NOTED NONE PRSNT: CPT | Performed by: NURSE PRACTITIONER

## 2025-06-12 PROCEDURE — 1160F RVW MEDS BY RX/DR IN RCRD: CPT | Performed by: NURSE PRACTITIONER

## 2025-06-12 PROCEDURE — 1159F MED LIST DOCD IN RCRD: CPT | Performed by: NURSE PRACTITIONER

## 2025-06-12 PROCEDURE — 99214 OFFICE O/P EST MOD 30 MIN: CPT | Performed by: NURSE PRACTITIONER

## 2025-06-12 PROCEDURE — 3075F SYST BP GE 130 - 139MM HG: CPT | Performed by: NURSE PRACTITIONER

## 2025-06-12 PROCEDURE — 99213 OFFICE O/P EST LOW 20 MIN: CPT

## 2025-06-12 PROCEDURE — 1036F TOBACCO NON-USER: CPT | Performed by: NURSE PRACTITIONER

## 2025-06-12 ASSESSMENT — PAIN SCALES - GENERAL: PAINLEVEL_OUTOF10: 0-NO PAIN

## 2025-06-12 ASSESSMENT — ENCOUNTER SYMPTOMS
HEMATURIA: 0
FATIGUE: 0
CHILLS: 0
CHEST TIGHTNESS: 0
LOSS OF SENSATION IN FEET: 0
WEAKNESS: 0
CLAUDICATION: 0
NEAR-SYNCOPE: 0
EYES NEGATIVE: 1
ACTIVITY CHANGE: 0
FEVER: 0
DEPRESSION: 0
ABDOMINAL PAIN: 0
UNEXPECTED WEIGHT CHANGE: 0
ORTHOPNEA: 0
COUGH: 0
PALPITATIONS: 0
CHEST PRESSURE: 0
WHEEZING: 0
EDEMA: 0
OCCASIONAL FEELINGS OF UNSTEADINESS: 0
ARTHRALGIAS: 1
LIGHT-HEADEDNESS: 0
CONFUSION: 0
SHORTNESS OF BREATH: 0
BLOOD IN STOOL: 0
ABDOMINAL DISTENTION: 0

## 2025-06-12 ASSESSMENT — COLUMBIA-SUICIDE SEVERITY RATING SCALE - C-SSRS
2. HAVE YOU ACTUALLY HAD ANY THOUGHTS OF KILLING YOURSELF?: NO
6. HAVE YOU EVER DONE ANYTHING, STARTED TO DO ANYTHING, OR PREPARED TO DO ANYTHING TO END YOUR LIFE?: NO
1. IN THE PAST MONTH, HAVE YOU WISHED YOU WERE DEAD OR WISHED YOU COULD GO TO SLEEP AND NOT WAKE UP?: NO

## 2025-06-12 ASSESSMENT — PATIENT HEALTH QUESTIONNAIRE - PHQ9
1. LITTLE INTEREST OR PLEASURE IN DOING THINGS: NOT AT ALL
2. FEELING DOWN, DEPRESSED OR HOPELESS: NOT AT ALL
SUM OF ALL RESPONSES TO PHQ9 QUESTIONS 1 AND 2: 0

## 2025-06-12 NOTE — PATIENT INSTRUCTIONS
Thank you for coming in today.  If you have any questions you may contact the office Monday through Friday at [phone] or on week ends at [phone].    Please continue current medications.     Please get lab work drawn today.     Please follow  a 2 GM sodium diet and limit fluid intake to 2 liters per day or 8 servings ( serving size = 8 oz. = 1 cup = 240 ml) per day.   Please avoid processed meat products (luncheon meats, sausages, mccrary, hot dogs for example) eat 4 servings of vegetables and 1-2 whole servings of whole fruits per day.   Please weigh daily and call [phone] for weight gain of 3 pounds in 24 hours or 5 pounds or if you experience increased swelling or shortness of breath.         Follow up:   4 months.     Please be sure to follow up with your cardiologist at [facility] once every year. Call [phone] to schedule appointment if you do not have a follow up appointment scheduled already.

## 2025-06-12 NOTE — PROGRESS NOTES
Subjective   Patient ID: Dragan Prince is a 75 y.o. male who presents for follow-up of congestive heart failure.     Current Outpatient Medications:   •  amLODIPine (Norvasc) 5 mg tablet, Take 1 tablet (5 mg) by mouth once daily., Disp: 90 tablet, Rfl: 3  •  aspirin 81 mg EC tablet, Take 1 tablet (81 mg) by mouth once daily., Disp: , Rfl:   •  carvedilol (Coreg) 12.5 mg tablet, Take 1 tablet (12.5 mg) by mouth 2 times daily (morning and late afternoon)., Disp: 180 tablet, Rfl: 3  •  diclofenac (Voltaren) 75 mg EC tablet, Take 1 tablet (75 mg) by mouth 2 times a day. Do not crush, chew, or split. (Patient taking differently: Take 1 tablet (75 mg) by mouth if needed (Pain). Do not crush, chew, or split.), Disp: , Rfl:   •  eplerenone (Inspra) 50 mg tablet, Take 1 tablet (50 mg) by mouth once daily., Disp: 90 tablet, Rfl: 3  •  hydrALAZINE (Apresoline) 50 mg tablet, Take 1 tablet (50 mg) by mouth 3 times a day., Disp: 270 tablet, Rfl: 3  •  magnesium oxide (Mag-Ox) 400 mg tablet, Take 1 tablet (400 mg) by mouth once daily. (Patient taking differently: Take 1 tablet (400 mg) by mouth once daily as needed (based on lab work).), Disp: 90 tablet, Rfl: 1  •  omeprazole (PriLOSEC) 20 mg DR capsule, Take 1 capsule (20 mg) by mouth once daily., Disp: , Rfl:   •  sacubitriL-valsartan (Entresto)  mg tablet, Take 1 tablet by mouth 2 times a day., Disp: 180 tablet, Rfl: 3     Congestive Heart Failure  Presents for follow-up visit. Pertinent negatives include no abdominal pain, chest pain, chest pressure, claudication, edema, fatigue, muscle weakness (complains of muscle cramping.), near-syncope, nocturia, orthopnea, palpitations, paroxysmal nocturnal dyspnea, shortness of breath or unexpected weight change. The symptoms have been stable. Compliance with total regimen is %. Compliance with diet is %. Compliance with medications is %.        PMH: presumed NICM EF 20-25% > 40-45%, restrictive diastolic  filling, moderate MR, HTN, FANNY, colon CA s/p CTX, acalculous cholecystitis, GERD, arthritis.      Review of Systems   Constitutional:  Negative for activity change, chills, fatigue, fever and unexpected weight change.   HENT:  Negative for hearing loss.    Eyes: Negative.    Respiratory:  Negative for cough, chest tightness, shortness of breath and wheezing.    Cardiovascular:  Negative for chest pain, palpitations, claudication, leg swelling and near-syncope.   Gastrointestinal:  Negative for abdominal distention, abdominal pain and blood in stool.   Genitourinary:  Negative for hematuria and nocturia.   Musculoskeletal:  Positive for arthralgias. Negative for muscle weakness (complains of muscle cramping.).        Ambulates with cane   Neurological:  Negative for syncope, weakness and light-headedness.   Psychiatric/Behavioral:  Negative for confusion.        Objective     /70 (BP Location: Left arm, Patient Position: Sitting)   Pulse 60   Resp 20   Wt 102 kg (223 lb 12.8 oz)   SpO2 96%   BMI 32.11 kg/m²         Transthoracic echo (TTE) complete  Result Date: 1/25/2025              [place] [address]      Phone [phone] Fax [phone] TRANSTHORACIC ECHOCARDIOGRAM REPORT Patient Name:       QUENTIN De Guzman Physician:    [id number] Felix Win MD Study Date:         1/24/2025           Ordering Provider:    [id number] LOUEI HARLEY MRN/PID:            [id number]            Fellow: Accession#:         [id number]        Nurse:                Lilly Jacobo Date of Birth/Age:  1950 / 74      Sonographer:          Krissy Martinez RDCS                     years Gender Assigned at  M                   Additional Staff: Birth: Height:             175.26 cm           Admit Date:           1/24/2025 Weight:             102.97 kg            Admission Status:     Outpatient BSA / BMI:          2.18 m2 / 33.52     Department Location:  [facility] Echo                     kg/m2                                     Lab Blood Pressure: 182 /96 mmHg Study Type:    TRANSTHORACIC ECHO (TTE) COMPLETE Diagnosis/ICD: Chronic systolic (congestive) heart failure (CHF)-I50.22;                Essential (primary) hypertension-I10 Indication:    Hypertension, Congestive Heart Failure CPT Codes:     Echo Complete w Full Doppler-[id number]  Study Detail: The following Echo studies were performed: 2D, M-Mode, Doppler and               color flow. Optison used as a contrast agent for endocardial               border definition. Total contrast used for this procedure was 3 mL               via IV push.  PHYSICIAN INTERPRETATION: Left Ventricle: Left ventricular ejection fraction is low normal, by visual estimate at 50-55%. There is severe eccentric left ventricular hypertrophy. There are no regional wall motion abnormalities. The left ventricular cavity size is mildly dilated. There is moderately increased septal and mildly increased posterior left ventricular wall thickness. Spectral Doppler shows a Grade I (impaired relaxation pattern) of left ventricular diastolic filling with normal left atrial filling pressure. Left Atrium: The left atrium is mildly dilated. Right Ventricle: The right ventricle is normal in size. There is normal right ventricular global systolic function. Right Atrium: The right atrium is normal in size. Aortic Valve: The aortic valve is trileaflet. The aortic valve dimensionless index is 0.61. There is no evidence of aortic valve regurgitation. The peak instantaneous gradient of the aortic valve is 8 mmHg. The mean gradient of the aortic valve is 4 mmHg. Mitral Valve: The mitral valve is normal in structure. There is trace mitral valve regurgitation. Tricuspid Valve: The tricuspid valve is structurally normal. There is trace tricuspid  regurgitation. The Doppler estimated RVSP is slightly elevated right ventricular systolic pressure at 33.9 mmHg. Pulmonic Valve: The pulmonic valve is structurally normal. There is physiologic pulmonic valve regurgitation. Pericardium: No pericardial effusion noted. Aorta: The aortic root is normal. Systemic Veins: The inferior vena cava appears normal in size.  CONCLUSIONS:  1. Left ventricular ejection fraction is low normal, by visual estimate at 50-55%.  2. Spectral Doppler shows a Grade I (impaired relaxation pattern) of left ventricular diastolic filling with normal left atrial filling pressure.  3. Left ventricular cavity size is mildly dilated.  4. There is normal right ventricular global systolic function.  5. Slightly elevated right ventricular systolic pressure.  6. There is moderately increased septal and mildly increased posterior left ventricular wall thickness. QUANTITATIVE DATA SUMMARY:  2D MEASUREMENTS:            Normal Ranges: IVSd:            1.71 cm    (0.6-1.1cm) LVPWd:           1.24 cm    (0.6-1.1cm) LVIDd:           5.89 cm    (3.9-5.9cm) LVIDs:           4.28 cm LV Mass Index:   185.7 g/m2 LV % FS          27.4 %  LA VOLUME:                    Normal Ranges: LA Vol A4C:        74.4 ml    (22+/-6mL/m2) LA Vol A2C:        101.9 ml LA Vol BP:         88.5 ml LA Vol Index A4C:  34.1 ml/m2 LA Vol Index A2C:  46.7 ml/m2 LA Vol Index BP:   40.6 ml/m2 LA Area A4C:       23.3 cm2 LA Area A2C:       27.7 cm2 LA Major Axis A4C: 6.2 cm LA Major Axis A2C: 6.4 cm LA Volume Index:   39.3 ml/m2 LA Vol A4C:        72.4 ml LA Vol A2C:        97.9 ml LA Vol Index BSA:  39.1 ml/m2  RA VOLUME BY A/L METHOD:          Normal Ranges: RA Area A4C:             18.5 cm2  M-MODE MEASUREMENTS:         Normal Ranges: Ao Root:             3.40 cm (2.0-3.7cm) AoV Exc:             1.50 cm (1.5-2.5cm) LAs:                 4.77 cm (2.7-4.0cm)  AORTA MEASUREMENTS:         Normal Ranges: AoV Exc:            1.50 cm (1.5-2.5cm)  Ao Sinus, d:        2.90 cm (2.1-3.5cm) Ao STJ, d:          2.30 cm (1.7-3.4cm) Asc Ao, d:          2.70 cm (2.1-3.4cm)  LV SYSTOLIC FUNCTION BY 2D PLANIMETRY (MOD):                      Normal Ranges: EF-A4C View:    65 % (>=55%) EF-A2C View:    58 % EF-Biplane:     59 % EF-Visual:      53 % LV EF Reported: 53 %  LV DIASTOLIC FUNCTION:             Normal Ranges: MV Peak E:             0.48 m/s    (0.7-1.2 m/s) MV Peak A:             0.77 m/s    (0.42-0.7 m/s) E/A Ratio:             0.62        (1.0-2.2) MV e'                  0.081 m/s   (>8.0) MV lateral e'          0.08 m/s MV medial e'           0.08 m/s MV A Dur:              154.14 msec E/e' Ratio:            5.95        (<8.0)  MITRAL VALVE:          Normal Ranges: MV DT:        287 msec (150-240msec)  AORTIC VALVE:                     Normal Ranges: AoV Vmax:                1.38 m/s (<=1.7m/s) AoV Peak P.6 mmHg (<20mmHg) AoV Mean P.5 mmHg (1.7-11.5mmHg) LVOT Max Paresh:            0.91 m/s (<=1.1m/s) AoV VTI:                 28.64 cm (18-25cm) LVOT VTI:                17.61 cm LVOT Diameter:           1.98 cm  (1.8-2.4cm) AoV Area, VTI:           1.85 cm2 (2.5-5.5cm2) AoV Area,Vmax:           1.97 cm2 (2.5-4.5cm2) AoV Dimensionless Index: 0.61  RIGHT VENTRICLE: RV Basal 3.99 cm RV Mid   3.20 cm RV Major 7.7 cm TAPSE:   24.3 mm RV s'    0.17 m/s  TRICUSPID VALVE/RVSP:          Normal Ranges: Peak TR Velocity:     2.78 m/s RV Syst Pressure:     34 mmHg  (< 30mmHg) IVC Diam:             1.60 cm  AORTA: Asc Ao Diam 2.70 cm  [id number] Felix Win MD Electronically signed on 2025 at 1:07:05 PM  ** Final **        Lab Results   Component Value Date    BUN 17 10/24/2024    CREATININE 1.08 10/24/2024    BNP 48 10/24/2024    MG 1.82 10/24/2024    K 4.1 10/24/2024     10/24/2024       Constitutional:       General:  NAD   HENT:      Head: Normocephalic     Mouth: Mucous membranes are moist.      Neck:  No JVD or HJR   Eyes:       Conjunctiva/sclera: Conjunctivae normal.    Cardiovascular:      Rate and Rhythm: Normal rate and regular rhythm      Heart sounds:  S1 S2 normal, no murmur, no S3 or S4   Pulmonary:      Pulmonary effort is normal.  Normal breath sounds No wheezing or rales.   Abdominal:      General: Bowel sounds are normal, non- tender to palpitations.  Musculoskeletal:         General: No swelling   GANN well.   Skin:     General: Skin is warm and dry   Neurological:      General: No focal deficit present.      Mental Status: alert and oriented to person, place, and time. Mental status is at baseline.     Psychiatric:         Mood and Affect: Normal Affect.     Assessment/Plan     Problem List Items Addressed This Visit       Chronic systolic heart failure      Chronic  systolic heart failure  improved EF   Etiology non ischemic   AHA Stage: C   NYHA class: 2   Volume Status: Compensated   GFR: 73     GDMT:  BB- carvedilol 6.25 mg 1 tablet twice a day  ARB/ACEI/ARNI -  Entresto 97/103 mg 1 tablet twice a day   MRA - eplerenone 50 mg once a day   SGLT2i -   reconsider trial of jardiance.   Diuretic - discontinued.   Device Therapy:  not needed due to improvement in EF    Hydralazine 50 mg 1 tablet three times a day.    Norvasc  5 mg once a day.      CHF:   Appears euvolemic today.  He is on tolerated medical therapy.  Will continue current medications.  Follow up in clinic 3 months.    Emphasized salt restriction.  Encouraged daily monitoring of the patient's weight.  Encouraged regular exercise.  Get copy of PCP labs recently completed.   Follow up in  6 weeks.       2. HTN:   Patient has not had morning medications today.   Reports  mm hg      3. Muscle cramps: Check electrolytes.     4.  Chest pain:   He has not had any further chest pain since before stress test.   [facility] 2022 showed only mild disease.   Echocardiogram earlier this year without RWMA.  Stress MPI shows apical scar but low probability of ischemia.   He has  not had any further chest pain since being on the Entresto.  BP is controlled.   I do not think he needs further invasive work up at this time.  However ,  if he redevelops chest pain then consider repeat LHC.     Jessica Hernandez, APRN-CNP

## 2025-06-13 ENCOUNTER — APPOINTMENT (OUTPATIENT)
Dept: PHARMACY | Facility: HOSPITAL | Age: 75
End: 2025-06-13
Payer: COMMERCIAL

## 2025-06-13 LAB
ANION GAP SERPL CALCULATED.4IONS-SCNC: 8 MMOL/L (CALC) (ref 7–17)
BNP SERPL-MCNC: 127 PG/ML
BUN SERPL-MCNC: 19 MG/DL (ref 7–25)
BUN/CREAT SERPL: ABNORMAL (CALC) (ref 6–22)
CALCIUM SERPL-MCNC: 9.5 MG/DL (ref 8.6–10.3)
CHLORIDE SERPL-SCNC: 104 MMOL/L (ref 98–110)
CO2 SERPL-SCNC: 30 MMOL/L (ref 20–32)
CREAT SERPL-MCNC: 1.11 MG/DL (ref 0.7–1.28)
EGFRCR SERPLBLD CKD-EPI 2021: 69 ML/MIN/1.73M2
GLUCOSE SERPL-MCNC: 120 MG/DL (ref 65–99)
MAGNESIUM SERPL-MCNC: 2.1 MG/DL (ref 1.5–2.5)
POTASSIUM SERPL-SCNC: 4.8 MMOL/L (ref 3.5–5.3)
SODIUM SERPL-SCNC: 142 MMOL/L (ref 135–146)

## 2025-06-16 ENCOUNTER — APPOINTMENT (OUTPATIENT)
Dept: PHARMACY | Facility: HOSPITAL | Age: 75
End: 2025-06-16
Payer: COMMERCIAL

## 2025-06-16 DIAGNOSIS — I50.22 CHRONIC SYSTOLIC HEART FAILURE: Primary | ICD-10-CM

## 2025-06-16 NOTE — PROGRESS NOTES
"  Pharmacist Clinic: Cardiology Management    Dragan Prince is a 75 y.o. male was referred to Clinical Pharmacy Team for heart failure management.     Referring Provider: Carlos Dia MD; Jessica Hernandez CNP    THIS IS A FOLLOW UP PATIENT APPOINTMENT. AT LAST VISIT ON 5/5/25 WITH PHARMACIST (Melissa Harley, PharmD).    Appointment was completed by patient who was reached at [phone].    REVIEW OF PAST APPNT (IF APPLICABLE):   5/5/25  Initial visit w/ clinical pharm to [facility].   Discussed HF regimen. Pt unable to continue Entresto d/t cost. Screened for  VAF PAP, approved 5/14/25 for Entresto.   Restarted Entresto 49-51 mg PO BID.    Interval Hx:  6/12/25 FUV w/ cardio, no changes  5/30/25 FUV w/ cardio   Increased Entresto to  mg PO BID    Allergies Reviewed? Yes    Allergies[1]    Medical History[2]    Medications Ordered Prior to Encounter[3]      RELEVANT LAB RESULTS:  Lab Results   Component Value Date    BILITOT 1.0 12/02/2022    CALCIUM 9.5 06/12/2025    CO2 30 06/12/2025     06/12/2025    CREATININE 1.11 06/12/2025    GLUCOSE 120 (H) 06/12/2025    ALKPHOS 65 12/02/2022    K 4.8 06/12/2025    PROT 7.1 12/02/2022     06/12/2025    AST 15 12/02/2022    ALT 12 12/02/2022    BUN 19 06/12/2025    ANIONGAP 8 06/12/2025    MG 2.1 06/12/2025    ALBUMIN 4.1 12/02/2022    LIPASE 46 02/16/2022    GFRMALE 73 09/14/2023     Lab Results   Component Value Date    TRIG 119 10/06/2022    CHOL 220 (H) 10/06/2022    HDL 48.8 10/06/2022     No results found for: \"BMCBC\", \"CBCDIF\"     PHARMACEUTICAL ASSESSMENT:    MEDICATION RECONCILIATION    Was a medication reconciliation completed at this visit? No  Home Pharmacy Reviewed? Yes, describe: [facility] [place]    Added:  - none  Changed:  - none  Removed:  - none    Drug Interactions? No    Medication Documentation Review Audit       Reviewed by Melissa Harley, PharmD (Pharmacist) on 06/16/25 at 1132      Medication Order Taking? Sig Documenting Provider Last " Dose Status   amLODIPine (Norvasc) 5 mg tablet [id number]  Take 1 tablet (5 mg) by mouth once daily. Carlos Dia MD  Active   aspirin 81 mg EC tablet [id number]  Take 1 tablet (81 mg) by mouth once daily. Historical Provider, MD  Active   carvedilol (Coreg) 12.5 mg tablet [id number]  Take 1 tablet (12.5 mg) by mouth 2 times daily (morning and late afternoon). Carlos Dia MD  Active   diclofenac (Voltaren) 75 mg EC tablet [id number]  Take 1 tablet (75 mg) by mouth 2 times a day. Do not crush, chew, or split.   Patient taking differently: Take 1 tablet (75 mg) by mouth if needed (Pain). Do not crush, chew, or split.    Historical Provider, MD  Active   eplerenone (Inspra) 50 mg tablet [id number]  Take 1 tablet (50 mg) by mouth once daily. Carlos Dia MD  Active   hydrALAZINE (Apresoline) 50 mg tablet [id number]  Take 1 tablet (50 mg) by mouth 3 times a day. Carlos Dia MD  Active   magnesium oxide (Mag-Ox) 400 mg tablet [id number]  Take 1 tablet (400 mg) by mouth once daily.   Patient taking differently: Take 1 tablet (400 mg) by mouth once daily as needed (based on lab work).    JOSSELINE Licea-CNP  Active   omeprazole (PriLOSEC) 20 mg DR capsule [id number]  Take 1 capsule (20 mg) by mouth once daily. Historical Provider, MD  Active   sacubitriL-valsartan (Entresto)  mg tablet [id number]  Take 1 tablet by mouth 2 times a day. Carlos Dia MD  Active                  DISEASE MANAGEMENT ASSESSMENT:     CHF ASSESSMENT     Symptom/Staging:  -Most recent ejection fraction: 50-55%  -NYHA Stage: II    Results for orders placed during the hospital encounter of 01/24/25    Transthoracic echo (TTE) [place] [id number]  Phone [phone] Fax [phone]    TRANSTHORACIC ECHOCARDIOGRAM REPORT    Patient Name:       QUENTIN De Guzman Physician:    [id number] Felix Win MD  Study Date:         1/24/2025            Ordering Provider:    [id number] LOUIE MARQUEZGHLIN  MRN/PID:            [id number]            Fellow:  Accession#:         [id number]        Nurse:                Lilly Jacobo  Date of Birth/Age:  1950 / 74      Sonographer:          Krissy Martinez RDCS  years  Gender Assigned at  M                   Additional Staff:  Birth:  Height:             175.26 cm           Admit Date:           1/24/2025  Weight:             102.97 kg           Admission Status:     Outpatient  BSA / BMI:          2.18 m2 / 33.52     Department Location:  [facility] Echo  kg/m2                                     Lab  Blood Pressure: 182 /96 mmHg    Study Type:    TRANSTHORACIC ECHO (TTE) COMPLETE  Diagnosis/ICD: Chronic systolic (congestive) heart failure (CHF)-I50.22;  Essential (primary) hypertension-I10  Indication:    Hypertension, Congestive Heart Failure  CPT Codes:     Echo Complete w Full Doppler-[id number]  Study Detail: The following Echo studies were performed: 2D, M-Mode, Doppler and  color flow. Optison used as a contrast agent for endocardial  border definition. Total contrast used for this procedure was 3 mL  via IV push.      PHYSICIAN INTERPRETATION:  Left Ventricle: Left ventricular ejection fraction is low normal, by visual estimate at 50-55%. There is severe eccentric left ventricular hypertrophy. There are no regional wall motion abnormalities. The left ventricular cavity size is mildly dilated. There is moderately increased septal and mildly increased posterior left ventricular wall thickness. Spectral Doppler shows a Grade I (impaired relaxation pattern) of left ventricular diastolic filling with normal left atrial filling pressure.  Left Atrium: The left atrium is mildly dilated.  Right Ventricle: The right ventricle is normal in size. There is normal right ventricular global systolic function.  Right Atrium: The right atrium is normal in size.  Aortic Valve: The aortic valve is trileaflet. The aortic valve  dimensionless index is 0.61. There is no evidence of aortic valve regurgitation. The peak instantaneous gradient of the aortic valve is 8 mmHg. The mean gradient of the aortic valve is 4 mmHg.  Mitral Valve: The mitral valve is normal in structure. There is trace mitral valve regurgitation.  Tricuspid Valve: The tricuspid valve is structurally normal. There is trace tricuspid regurgitation. The Doppler estimated RVSP is slightly elevated right ventricular systolic pressure at 33.9 mmHg.  Pulmonic Valve: The pulmonic valve is structurally normal. There is physiologic pulmonic valve regurgitation.  Pericardium: No pericardial effusion noted.  Aorta: The aortic root is normal.  Systemic Veins: The inferior vena cava appears normal in size.      CONCLUSIONS:  1. Left ventricular ejection fraction is low normal, by visual estimate at 50-55%.  2. Spectral Doppler shows a Grade I (impaired relaxation pattern) of left ventricular diastolic filling with normal left atrial filling pressure.  3. Left ventricular cavity size is mildly dilated.  4. There is normal right ventricular global systolic function.  5. Slightly elevated right ventricular systolic pressure.  6. There is moderately increased septal and mildly increased posterior left ventricular wall thickness.    QUANTITATIVE DATA SUMMARY:    2D MEASUREMENTS:            Normal Ranges:  IVSd:            1.71 cm    (0.6-1.1cm)  LVPWd:           1.24 cm    (0.6-1.1cm)  LVIDd:           5.89 cm    (3.9-5.9cm)  LVIDs:           4.28 cm  LV Mass Index:   185.7 g/m2  LV % FS          27.4 %      LA VOLUME:                    Normal Ranges:  LA Vol A4C:        74.4 ml    (22+/-6mL/m2)  LA Vol A2C:        101.9 ml  LA Vol BP:         88.5 ml  LA Vol Index A4C:  34.1 ml/m2  LA Vol Index A2C:  46.7 ml/m2  LA Vol Index BP:   40.6 ml/m2  LA Area A4C:       23.3 cm2  LA Area A2C:       27.7 cm2  LA Major Axis A4C: 6.2 cm  LA Major Axis A2C: 6.4 cm  LA Volume Index:   39.3 ml/m2  LA  Vol A4C:        72.4 ml  LA Vol A2C:        97.9 ml  LA Vol Index BSA:  39.1 ml/m2      RA VOLUME BY A/L METHOD:          Normal Ranges:  RA Area A4C:             18.5 cm2      M-MODE MEASUREMENTS:         Normal Ranges:  Ao Root:             3.40 cm (2.0-3.7cm)  AoV Exc:             1.50 cm (1.5-2.5cm)  LAs:                 4.77 cm (2.7-4.0cm)      AORTA MEASUREMENTS:         Normal Ranges:  AoV Exc:            1.50 cm (1.5-2.5cm)  Ao Sinus, d:        2.90 cm (2.1-3.5cm)  Ao STJ, d:          2.30 cm (1.7-3.4cm)  Asc Ao, d:          2.70 cm (2.1-3.4cm)      LV SYSTOLIC FUNCTION BY 2D PLANIMETRY (MOD):  Normal Ranges:  EF-A4C View:    65 % (>=55%)  EF-A2C View:    58 %  EF-Biplane:     59 %  EF-Visual:      53 %  LV EF Reported: 53 %      LV DIASTOLIC FUNCTION:             Normal Ranges:  MV Peak E:             0.48 m/s    (0.7-1.2 m/s)  MV Peak A:             0.77 m/s    (0.42-0.7 m/s)  E/A Ratio:             0.62        (1.0-2.2)  MV e'                  0.081 m/s   (>8.0)  MV lateral e'          0.08 m/s  MV medial e'           0.08 m/s  MV A Dur:              154.14 msec  E/e' Ratio:            5.95        (<8.0)      MITRAL VALVE:          Normal Ranges:  MV DT:        287 msec (150-240msec)      AORTIC VALVE:                     Normal Ranges:  AoV Vmax:                1.38 m/s (<=1.7m/s)  AoV Peak P.6 mmHg (<20mmHg)  AoV Mean P.5 mmHg (1.7-11.5mmHg)  LVOT Max Paresh:            0.91 m/s (<=1.1m/s)  AoV VTI:                 28.64 cm (18-25cm)  LVOT VTI:                17.61 cm  LVOT Diameter:           1.98 cm  (1.8-2.4cm)  AoV Area, VTI:           1.85 cm2 (2.5-5.5cm2)  AoV Area,Vmax:           1.97 cm2 (2.5-4.5cm2)  AoV Dimensionless Index: 0.61      RIGHT VENTRICLE:  RV Basal 3.99 cm  RV Mid   3.20 cm  RV Major 7.7 cm  TAPSE:   24.3 mm  RV s'    0.17 m/s      TRICUSPID VALVE/RVSP:          Normal Ranges:  Peak TR Velocity:     2.78 m/s  RV Syst Pressure:     34 mmHg  (<  30mmHg)  IVC Diam:             1.60 cm      AORTA:  Asc Ao Diam 2.70 cm      [id number] Felix Win MD  Electronically signed on 1/25/2025 at 1:07:05 PM        ** Final **    Guideline-Directed Medical Therapy:  -ARNI: Yes, describe: Entresto -   -Beta Blocker: Yes, describe: carvedilol IR  -MRA: Yes, describe: eplerenone  -SGLT2i: No     Secondary Prevention:  -The 10-year ASCVD risk score (Stacey AYALA, et al., 2019) is: 30.1%    Values used to calculate the score:      Age: 75 years      Sex: Male      Is Non- : No      Diabetic: No      Tobacco smoker: No      Systolic Blood Pressure: 130 mmHg      Is BP treated: Yes      HDL Cholesterol: 48.8 mg/dL      Total Cholesterol: 220 mg/dL   -Aspirin 81mg? yes  -Statin?: No  -HTN?: Yes, describe: treated    CURRENT PHARMACOTHERAPY:   Carvedilol 12.5 mg PO BID  Eplerenone 50 mg PO daily  Entresto  mg PO BID    Affordability: cost is barrier, enrolled in [facility] PAP for Entresto  Adherence/Compliance: no concerns w/ other medications  Adverse Effects: no concerns  Recently increased Entresto dose - denies known side effects or concerns  Headaches: denies at this visit, pt attributes to sinus pressure d/t allergies. Reports allergies have been worse this year.  Muscle cramps: at baseline, pt reports manageable    Monitoring Weights at Home: No  Home Weight Recordings: N/A    Past In Office Weight Readings:   Wt Readings from Last 6 Encounters:   06/12/25 102 kg (223 lb 12.8 oz)   05/30/25 103 kg (227 lb 12.8 oz)   03/06/25 102 kg (223 lb 14.4 oz)   01/10/25 103 kg (227 lb 11.2 oz)   11/14/24 102 kg (225 lb 1.6 oz)   08/09/24 103 kg (227 lb 9.6 oz)     Monitoring Blood Pressure at Home: Yes   Home BP Recordings: log not available, reports stable, no concerns    Past In Office BP Readings:   BP Readings from Last 6 Encounters:   06/12/25 130/70   05/30/25 138/76   03/06/25 118/67   01/10/25 (!) 182/96   11/14/24 153/71   08/09/24 143/79     [facility]  "MANAGEMENT    Maintaining fluid restriction (<2 L/day): Yes  Edema/swelling: No  Shortness of breath: No  Trouble sleeping/lying down: No  Dry/hacking cough: No  Recent Hospitalizations: No    EDUCATION/COUNSELING:   - Counseled patient on MOA, expectations, duration of therapy, contraindications, administration, and monitoring parameters  - Counseled patient on lifestyle modifications that can decrease your risk of having complications (smoking cessation, losing weight, daily weights, vaccines)  - Counseled patient on fluid intake and weight management. Recommended to not consume more than 2 liters of fliuids per day. If they have gained more than 2-3 pounds within a 24 hours period (or 5 pounds in a week), contact their cardiologist  - Answered all patient questions and concerns     DISCUSSION/NOTES:   1 mo FUV. Tolerating regimen well, denies side effects w/ restarting Entresto. Now back at target dose.   BP greatly improved in office w/ restart of Entresto.   HF  GDMT [3]/4  ARNI - yes, Entresto  mg PO BID. At target dose.  BB - yes, carvedilol 12.5 mg PO BID. Highest tolerated dose, resting BPM = 60  MRA - yes, eplerenone 50 mg PO daily. At target dose.  SGLT2i - no. Previously on Farxiga 10 mg PO daily, discontinued d/t possible side effects/cost.   Last cardio note considering retrial of SGLT2i.  Plan: continue current tx regimen. On max tolerated GDMT 3/4. Doing well.   Future considerations: SGLT2i  Discussed w/ pt at today's visit. Pt unable to recall reason for discontinuation of Farxiga in past. Possibly d/t cost.   Counseled pt on indication for SGLT2i, benefits long term.   Declines initiation of SGLT2i at today's visit stating \"I'm on too many pills\". Informed pt this medication would be covered by [facility] PAP and clinical pharm would be happy to assist w/ initiation and cost in the future PRN.   Consider Farxiga or Jardiance in future. Pt to discuss w/ cardio at next " visit.    ASSESSMENT:    Assessment/Plan   Problem List Items Addressed This Visit       Chronic systolic heart failure - Primary    Continues on Entresto, eplerenone, carvedilol. Denies side effects or concerns.         Relevant Orders    Referral to Clinical Pharmacy     RECOMMENDATIONS/PLAN:    HF. Continue current tx regimen:  Carvedilol 12.5 mg PO BID  Eplerenone 50 mg PO daily  Entresto  mg PO BID    Last Appnt with Referring Provider: 5/30/25 ([place]); 6/12/25 (David)  Next Appnt with Referring Provider: 10/13/25 (David)  Clinical Pharmacist follow up: 6 mo  VAF/Application Expiration: 5/14/26  Type of Encounter: Rachel Harley PharmD    Verbal consent to manage patient's drug therapy was obtained from the patient . They were informed they may decline to participate or withdraw from participation in pharmacy services at any time.    Continue all meds under the continuation of care with the referring provider and clinical pharmacy team.           [1]   Allergies  Allergen Reactions    Morphine Drowsiness and Dizziness     BP drops change of mental status     Pass out    Opioids - Morphine Analogues Drowsiness    Spironolactone Unknown     Breast tenderness   [2]   Past Medical History:  Diagnosis Date    Bipolar disorder, current episode manic without psychotic features, unspecified (Multi)     Bipolar disorder, current episode manic without psychotic features    Other conditions influencing health status     Arthritis    Personal history of other diseases of male genital organs     History of benign prostatic hypertrophy    Personal history of other diseases of the circulatory system     History of hypertension    Personal history of other diseases of the digestive system     History of esophageal reflux    Personal history of other diseases of the musculoskeletal system and connective tissue     History of low back pain    Personal history of other diseases of the nervous system and  sense organs     History of sleep apnea    Personal history of other malignant neoplasm of large intestine 05/13/2022    History of malignant neoplasm of colon    Personal history of other mental and behavioral disorders     History of depression   [3]   Current Outpatient Medications on File Prior to Visit   Medication Sig Dispense Refill    amLODIPine (Norvasc) 5 mg tablet Take 1 tablet (5 mg) by mouth once daily. 90 tablet 3    aspirin 81 mg EC tablet Take 1 tablet (81 mg) by mouth once daily.      carvedilol (Coreg) 12.5 mg tablet Take 1 tablet (12.5 mg) by mouth 2 times daily (morning and late afternoon). 180 tablet 3    diclofenac (Voltaren) 75 mg EC tablet Take 1 tablet (75 mg) by mouth 2 times a day. Do not crush, chew, or split. (Patient taking differently: Take 1 tablet (75 mg) by mouth if needed (Pain). Do not crush, chew, or split.)      eplerenone (Inspra) 50 mg tablet Take 1 tablet (50 mg) by mouth once daily. 90 tablet 3    hydrALAZINE (Apresoline) 50 mg tablet Take 1 tablet (50 mg) by mouth 3 times a day. 270 tablet 3    magnesium oxide (Mag-Ox) 400 mg tablet Take 1 tablet (400 mg) by mouth once daily. (Patient taking differently: Take 1 tablet (400 mg) by mouth once daily as needed (based on lab work).) 90 tablet 1    omeprazole (PriLOSEC) 20 mg DR capsule Take 1 capsule (20 mg) by mouth once daily.      sacubitriL-valsartan (Entresto)  mg tablet Take 1 tablet by mouth 2 times a day. 180 tablet 3     No current facility-administered medications on file prior to visit.

## 2025-06-16 NOTE — Clinical Note
Continues to do well on current regimen. Tolerating Entresto max dose very well, denies side effects. Discussed benefits of SGLT2i; however pt would like to defer at this time.

## 2025-06-16 NOTE — Clinical Note
Continues to do well on max dose Entresto. Discussed addition of SGLT2i, pt declines at this time. Counseled

## 2025-07-29 ENCOUNTER — HOSPITAL ENCOUNTER (OUTPATIENT)
Dept: GENERAL RADIOLOGY | Age: 75
Discharge: HOME | End: 2025-07-31
Payer: MEDICARE

## 2025-07-29 ENCOUNTER — HOSPITAL ENCOUNTER (OUTPATIENT)
Age: 75
Discharge: HOME | End: 2025-07-31
Payer: MEDICARE

## 2025-07-29 DIAGNOSIS — M54.50 PAIN, LUMBAR REGION: ICD-10-CM

## 2025-07-29 PROCEDURE — 72100 X-RAY EXAM L-S SPINE 2/3 VWS: CPT

## 2025-07-29 PROCEDURE — 72170 X-RAY EXAM OF PELVIS: CPT

## 2025-08-26 PROCEDURE — RXMED WILLOW AMBULATORY MEDICATION CHARGE

## 2025-08-27 ENCOUNTER — PHARMACY VISIT (OUTPATIENT)
Dept: PHARMACY | Facility: CLINIC | Age: 75
End: 2025-08-27
Payer: COMMERCIAL

## 2025-12-15 ENCOUNTER — APPOINTMENT (OUTPATIENT)
Dept: PHARMACY | Facility: HOSPITAL | Age: 75
End: 2025-12-15
Payer: COMMERCIAL

## (undated) DEVICE — MICROSURGICAL INSTRUMENT ANTERIOR CHAMBER CANNULA 30GA: Brand: ALCON

## (undated) DEVICE — STERILE POLYISOPRENE POWDER-FREE SURGICAL GLOVES: Brand: PROTEXIS

## (undated) DEVICE — SOLUTION IRRIGATION BAL SALT SOLUTION 15 ML STRL BSS

## (undated) DEVICE — COUNTER NDL 30 COUNT DBL MAG

## (undated) DEVICE — APPLICATOR  COTTON-TIPPED 6 IN WOOD STRL

## (undated) DEVICE — GAUZE,SPONGE,4"X4",8PLY,STRL,LF,10/TRAY: Brand: MEDLINE

## (undated) DEVICE — CLEARCUT® SLIT KNIFE INTREPID MICRO-COAXIAL SYSTEM 2.4 SB: Brand: CLEARCUT®; INTREPID

## (undated) DEVICE — COVER MICROSCOPE KNOB LG

## (undated) DEVICE — TRAY,SKIN SCRUB,DRY,W/GAUZE: Brand: MEDLINE

## (undated) DEVICE — MARKER,SKIN,WI/RULER AND LABELS: Brand: MEDLINE

## (undated) DEVICE — CYTOTOME IRRIG 25GA L16MM ANT CAP BENT

## (undated) DEVICE — SHIELD EYE W3XL2.5IN UNIV CLR PLAS LTWT

## (undated) DEVICE — 40436 HEAD REST OCULAR: Brand: 40436 HEAD REST OCULAR

## (undated) DEVICE — KIT OPHTHLMC W/7.3CM X 7.3CM INSTRMNT WIPE 0.4CM X 15CM EYE

## (undated) DEVICE — GLOVE ORANGE PI 7   MSG9070

## (undated) DEVICE — PAD PREP L 2 PLY 70% ISO ALC NONWOVEN SFT ABSRB TOP ANTISEP

## (undated) DEVICE — SCALPEL 15 DEG NO 715

## (undated) DEVICE — GLASSES SAFETY PROTCT GRN

## (undated) DEVICE — DRAPE,OPHTHALMIC,77X100,STERILE: Brand: MEDLINE

## (undated) DEVICE — CANNULA OPHTH 25GA 7 8IN ORNG 45DEG ANG 4MM FR END DOME SHP

## (undated) DEVICE — PAD,EYE,1-5/8X2 5/8,STERILE,LF,1/PK: Brand: MEDLINE

## (undated) DEVICE — COVER,MAYO STAND,STERILE: Brand: MEDLINE

## (undated) DEVICE — PACK PROC FLD MGMT SYS CENTURION CUST

## (undated) DEVICE — THE MONARCH® "D" CARTRIDGE IS A SINGLE-USE POLYPROPYLENE CARTRIDGE FOR POSTERIOR CHAMBER IOL DELIVERY: Brand: MONARCH® III

## (undated) DEVICE — SPEAR SURG TRIANG SHP HNDL PCH WECK-CEL

## (undated) DEVICE — GOWN,SIRUS,FABRNF,XL,20/CS: Brand: MEDLINE

## (undated) DEVICE — GLOVE SURG SZ 8 CRM LTX FREE POLYISOPRENE POLYMER BEAD ANTI

## (undated) DEVICE — PACK PROCEDURE SURG SURG CATARACT CUSTOM

## (undated) DEVICE — NEEDLE RETROBLB L1 1 2IN OD25GA SHRP

## (undated) DEVICE — NEEDLE FLTR 18GA L1.5IN MEM THK5UM BLNT DISP

## (undated) DEVICE — SYRINGE, LUER LOCK, 5ML: Brand: MEDLINE

## (undated) DEVICE — MEDICINE CUP, GRADUATED, STER: Brand: MEDLINE

## (undated) DEVICE — COVER,TABLE,60X90,STERILE: Brand: MEDLINE